# Patient Record
Sex: FEMALE | Race: WHITE | NOT HISPANIC OR LATINO
[De-identification: names, ages, dates, MRNs, and addresses within clinical notes are randomized per-mention and may not be internally consistent; named-entity substitution may affect disease eponyms.]

---

## 2011-11-03 LAB — HPV ABSTRACT: NORMAL

## 2014-05-07 LAB — HPV ABSTRACT: NORMAL

## 2016-09-02 LAB — HPV ABSTRACT: NORMAL

## 2018-10-13 ENCOUNTER — HEALTH MAINTENANCE LETTER (OUTPATIENT)
Age: 43
End: 2018-10-13

## 2018-10-18 ENCOUNTER — OFFICE VISIT (OUTPATIENT)
Dept: FAMILY MEDICINE | Facility: CLINIC | Age: 43
End: 2018-10-18

## 2018-10-18 VITALS
OXYGEN SATURATION: 99 % | TEMPERATURE: 98 F | WEIGHT: 168.8 LBS | SYSTOLIC BLOOD PRESSURE: 108 MMHG | HEART RATE: 78 BPM | HEIGHT: 62 IN | BODY MASS INDEX: 31.06 KG/M2 | RESPIRATION RATE: 16 BRPM | DIASTOLIC BLOOD PRESSURE: 82 MMHG

## 2018-10-18 DIAGNOSIS — E34.9 HORMONE IMBALANCE: ICD-10-CM

## 2018-10-18 DIAGNOSIS — K44.9 HIATAL HERNIA: ICD-10-CM

## 2018-10-18 DIAGNOSIS — R10.32 ABDOMINAL PAIN, LEFT LOWER QUADRANT: ICD-10-CM

## 2018-10-18 DIAGNOSIS — Z85.820 HISTORY OF MELANOMA EXCISION: ICD-10-CM

## 2018-10-18 DIAGNOSIS — R10.12 ABDOMINAL PAIN, LEFT UPPER QUADRANT: ICD-10-CM

## 2018-10-18 DIAGNOSIS — Z00.00 ROUTINE GENERAL MEDICAL EXAMINATION AT A HEALTH CARE FACILITY: Primary | ICD-10-CM

## 2018-10-18 DIAGNOSIS — Z13.1 SCREENING FOR DIABETES MELLITUS: ICD-10-CM

## 2018-10-18 DIAGNOSIS — N80.9 ENDOMETRIOSIS: ICD-10-CM

## 2018-10-18 DIAGNOSIS — Z23 NEED FOR PROPHYLACTIC VACCINATION AND INOCULATION AGAINST INFLUENZA: ICD-10-CM

## 2018-10-18 DIAGNOSIS — Z98.890 HISTORY OF MELANOMA EXCISION: ICD-10-CM

## 2018-10-18 DIAGNOSIS — R06.02 SHORTNESS OF BREATH: ICD-10-CM

## 2018-10-18 DIAGNOSIS — R63.5 WEIGHT GAIN: ICD-10-CM

## 2018-10-18 DIAGNOSIS — Z13.220 SCREENING CHOLESTEROL LEVEL: ICD-10-CM

## 2018-10-18 LAB
% GRANULOCYTES: 64.1 % (ref 42.2–75.2)
HCT VFR BLD AUTO: 41.1 % (ref 35–46)
HEMOGLOBIN: 13.8 G/DL (ref 11.8–15.5)
LYMPHOCYTES NFR BLD AUTO: 28 % (ref 20.5–51.1)
MCH RBC QN AUTO: 30 PG (ref 27–31)
MCHC RBC AUTO-ENTMCNC: 33.6 G/DL (ref 33–37)
MCV RBC AUTO: 89.2 FL (ref 80–100)
MONOCYTES NFR BLD AUTO: 7.9 % (ref 1.7–9.3)
PLATELET # BLD AUTO: 269 K/UL (ref 140–450)
RBC # BLD AUTO: 4.61 X10/CMM (ref 3.7–5.2)
WBC # BLD AUTO: 6.3 X10/CMM (ref 3.8–11)

## 2018-10-18 PROCEDURE — 99396 PREV VISIT EST AGE 40-64: CPT | Mod: 25 | Performed by: FAMILY MEDICINE

## 2018-10-18 PROCEDURE — 80050 GENERAL HEALTH PANEL: CPT | Mod: 90 | Performed by: FAMILY MEDICINE

## 2018-10-18 PROCEDURE — 90686 IIV4 VACC NO PRSV 0.5 ML IM: CPT | Performed by: FAMILY MEDICINE

## 2018-10-18 PROCEDURE — 82150 ASSAY OF AMYLASE: CPT | Mod: 90 | Performed by: FAMILY MEDICINE

## 2018-10-18 PROCEDURE — 90471 IMMUNIZATION ADMIN: CPT | Performed by: FAMILY MEDICINE

## 2018-10-18 PROCEDURE — 83690 ASSAY OF LIPASE: CPT | Mod: 90 | Performed by: FAMILY MEDICINE

## 2018-10-18 PROCEDURE — 99214 OFFICE O/P EST MOD 30 MIN: CPT | Mod: 25 | Performed by: FAMILY MEDICINE

## 2018-10-18 PROCEDURE — 80061 LIPID PANEL: CPT | Mod: 90 | Performed by: FAMILY MEDICINE

## 2018-10-18 PROCEDURE — 36415 COLL VENOUS BLD VENIPUNCTURE: CPT | Performed by: FAMILY MEDICINE

## 2018-10-18 NOTE — LETTER
"[facility]  [address] [place]  [address]  Phone: [phone]    October 23, 2018      Dione Villa  [address] [facility] [address]              Dear Dione,    The results from your recent visit showed Your labs all look good with the exception of your LDL (\"bad cholesterol\"). The may be genetic or due to your diet. As you mentioned during your visit, you walk occasionally for exercise, but are otherwise quite sedentary. This would be a great time to eat right-sized portions of heart healthy diet and increase your level of physical activity. We talked about how it takes 150 minutes a week just to maintain, so please look at your schedule to find ways you can increase your exercise to more than 150 minutes/week. It may also be quite helpful to increase the intensity of your activity as well.   The minimally low amylase is not a great concern - we checked it look for pancreatitis as a source of your abdominal discomfort which can happen when the lipase and amylase are high.   Everything else looks very good.         Sincerely,     Naty \"Paige\" MD Julio Cesar/Gabrielle Odonnell,CMA      Results for orders placed or performed in visit on 10/18/18   CBC with Diff/Plt ([facility])   Result Value Ref Range    WBC x10/cmm 6.3 3.8 - 11.0 x10/cmm    % Lymphocytes 28.0 20.5 - 51.1 %    % Monocytes 7.9 1.7 - 9.3 %    % Granulocytes 64.1 42.2 - 75.2 %    RBC x10/cmm 4.61 3.7 - 5.2 x10/cmm    Hemoglobin 13.8 11.8 - 15.5 g/dl    Hematocrit 41.1 35 - 46 %    MCV 89.2 80 - 100 fL    MCH 30.0 27.0 - 31.0 pg    MCHC 33.6 33.0 - 37.0 g/dL    Platelet Count 269 140 - 450 K/uL   Comp. Metabolic Panel (14) ([facility])   Result Value Ref Range    Glucose 91 65 - 99 mg/dL    Urea Nitrogen 16 6 - 24 mg/dL    Creatinine 0.88 0.57 - 1.00 mg/dL    eGFR If NonAfricn Am 80 >59 mL/min/1.73    eGFR If Africn Am 92 >59 mL/min/1.73    BUN/Creatinine Ratio 18 9 - 23    Sodium 139 134 - 144 mmol/L    Potassium 4.6 3.5 - " 5.2 mmol/L    Chloride 102 96 - 106 mmol/L    Total CO2 21 20 - 29 mmol/L    Calcium 9.4 8.7 - 10.2 mg/dL    Protein Total 7.3 6.0 - 8.5 g/dL    Albumin 4.6 3.5 - 5.5 g/dL    Globulin, Total 2.7 1.5 - 4.5 g/dL    A/G Ratio 1.7 1.2 - 2.2    Bilirubin Total 0.3 0.0 - 1.2 mg/dL    Alkaline Phosphatase 81 39 - 117 IU/L    AST 22 0 - 40 IU/L    ALT 18 0 - 32 IU/L    Narrative    Performed at:  01 - [facility] [address], [place], [place]  [id number]  : Ezekiel Cuello MD, Phone:  [phone]   [facility]id [facility]   Result Value Ref Range    Cholesterol 260 (H) 100 - 199 mg/dL    Triglycerides 100 0 - 149 mg/dL    HDL Cholesterol 63 >39 mg/dL    VLDL Cholesterol Drew 20 5 - 40 mg/dL    LDL Cholesterol Calculated 177 (H) 0 - 99 mg/dL    LDL/HDL Ratio 2.8 0.0 - 3.2 ratio    Narrative    Performed at:  01 - [facility] [address], [place], [place]  [id number]  : Ezekiel Cuello MD, Phone:  [phone]   Lipase  Serum ([facility])   Result Value Ref Range    Lipase 23 14 - 72 U/L    Narrative    Performed at:  01 - [facility] [address], [place], [place]  [id number]  : Ezekiel Cuello MD, Phone:  [phone]   Amylase  Serum ([facility])   Result Value Ref Range    Amylase 29 (L) 31 - 124 U/L    Narrative    Performed at:  01 - [facility] [address], [place], [place]  [id number]  : Ezekiel Cuello MD, Phone:  [phone]   TSH ([facility])   Result Value Ref Range    TSH 2.120 0.450 - 4.500 uIU/mL    Narrative    Performed at:  01 - [facility] [address], [place], [place]  [id number]  : Ezekiel Cuello MD, Phone:  [phone]

## 2018-10-18 NOTE — PROGRESS NOTES

## 2018-10-18 NOTE — MR AVS SNAPSHOT
After Visit Summary   10/18/2018    Dione Villa    MRN: [id number]           Patient Information     Date Of Birth          1975        Visit Information        Provider Department      10/18/2018 8:15 AM Naty Harrell MD [facility]        Today's Diagnoses     Routine general medical examination at a health care facility    -  1    Hiatal hernia        Abdominal pain, left lower quadrant        Abdominal pain, left upper quadrant        Need for prophylactic vaccination and inoculation against influenza        Screening cholesterol level        History of melanoma excision        Weight gain        Hormone imbalance        Endometriosis          Care Instructions      Preventive Health Recommendations  Female Ages 40 to 49    Yearly exam:     See your health care provider every year in order to  1. Review health changes.   2. Discuss preventive care.    3. Review your medicines if your doctor prescribed any.      Get a Pap test every three years (unless you have an abnormal result and your provider advises testing more often).      If you get Pap tests with HPV test, you only need to test every 5 years, unless you have an abnormal result. You do not need a Pap test if your uterus was removed (hysterectomy) and you have not had cancer.      You should be tested each year for STDs (sexually transmitted diseases), if you're at risk.     Ask your doctor if you should have a mammogram.      Have a colonoscopy (test for colon cancer) if someone in your family has had colon cancer or polyps before age 50.       Have a cholesterol test every 5 years.       Have a diabetes test (fasting glucose) after age 45. If you are at risk for diabetes, you should have this test every 3 years.    Shots: Get a flu shot each year. Get a tetanus shot every 10 years.     Nutrition:     Eat at least 5 servings of fruits and vegetables each day.    Eat whole-grain bread, whole-wheat pasta and  "brown rice instead of white grains and rice.    Get adequate Calcium and Vitamin D.      Lifestyle    Exercise at least 150 minutes a week (an average of 30 minutes a day, 5 days a week). This will help you control your weight and prevent disease.    Limit alcohol to one drink per day.    No smoking.     Wear sunscreen to prevent skin cancer.    See your dentist every six months for an exam and cleaning.          Follow-ups after your visit        Additional Services     Referral to [facility]       Referral to [facility]  Phone: [phone]  Fax: [phone]  Reason for referral: CT C/A/P w/ PO contrast for LUQ and LLQ abd pain, hiatal hernia, melanoma                  Who to contact     If you have questions or need follow up information about today's clinic visit or your schedule please contact [facility] directly at [phone].  Normal or non-critical lab and imaging results will be communicated to you by Canvahart, letter or phone within 4 business days after the clinic has received the results. If you do not hear from us within 7 days, please contact the clinic through Canvahart or phone. If you have a critical or abnormal lab result, we will notify you by phone as soon as possible.  Submit refill requests through S B E or call your pharmacy and they will forward the refill request to us. Please allow 3 business days for your refill to be completed.          Additional Information About Your Visit        [employer] Information     S B E lets you send messages to your doctor, view your test results, renew your prescriptions, schedule appointments and more. To sign up, go to [email or web address] . Click on \"Log in\" on the left side of the screen, which will take you to the Welcome page. Then click on \"Sign up Now\" on the right side of the page.     You will be asked to enter the access code listed below, as well as some personal information. Please follow the directions to create " "your username and password.     Your access code is: [email or web address]  Expires: 2019  9:17 AM     Your access code will  in 90 days. If you need help or a new code, please call your [place] clinic or [phone].        Care EveryWhere ID     This is your Care EveryWhere ID. This could be used by other organizations to access your [place] medical records  [id number]        Your Vitals Were     Pulse Temperature Respirations Height Pulse Oximetry BMI (Body Mass Index)    78 98  F (36.7  C) (Oral) 16 1.575 m (5' 2\") 99% 30.87 kg/m2       Blood Pressure from Last 3 Encounters:   10/18/18 108/82   16 118/86   05/29/15 110/68    Weight from Last 3 Encounters:   10/18/18 76.6 kg (168 lb 12.8 oz)   16 73.5 kg (162 lb)   05/29/15 68.9 kg (152 lb)              We Performed the Following     Amylase  Serum ([facility])     CBC with Diff/Plt ([facility])     Comp. Metabolic Panel (14) ([facility])     FLU VACCINE, SPLIT VIRUS, IM (QUADRIVALENT) [26160]- >3 YRS     Lipase  Serum ([facility])     Lipid Panel ([facility])     Referral to [facility] ([facility])     Vaccine Administration, Initial [[id number]]        Primary Care Provider Office Phone # Fax #    Naty Krys Harrell [phone] [address] [facility] [address]        Equal Access to Services     [facility]: Hadii aad ku hadasho Soomaali, waaxda luqadaha, qaybta kaalmada adenupur, janey mckeon . So [facility] [phone].    ATENCIÓN: Si habla español, tiene a byrne disposición servicios gratuitos de asistencia lingüística. Llame al [phone].    We comply with applicable federal civil rights laws and [place] laws. We do not discriminate on the basis of race, color, national origin, age, disability, sex, sexual orientation, or gender identity.            Thank you!     Thank you for choosing [facility]  for your care. Our goal is always to provide you with excellent care. Hearing " back from our patients is one way we can continue to improve our services. Please take a few minutes to complete the written survey that you may receive in the mail after your visit with us. Thank you!             Your Updated Medication List - Protect others around you: Learn how to safely use, store and throw away your medicines at www.disposemymeds.org.          This list is accurate as of 10/18/18  9:18 AM.  Always use your most recent med list.                   Brand Name Dispense Instructions for use Diagnosis    AVIANE 0.1-20 MG-MCG per tablet   Generic drug:  levonorgestrel-ethinyl estradiol      Take 1 tablet by mouth daily.        DAILY MULTI PO      Take 1 tablet by mouth daily.

## 2018-10-18 NOTE — PROGRESS NOTES
SUBJECTIVE:   CC: Dione Villa is an 43 year old woman who presents for preventive health visit.     Pt is known to [facility]; prior PCP Dr. Lea Siddiqi retired. Pt is new to me today. Last seen here 8/4/2016 for a bug bite. No prior annual physicals on file.   Sees Ob-Gyn regularly for endometriosis and her Paps. No hx of abnormal Paps.   Last mammo 11/2013 here, but has been having them regularly due to sister's hx of breast cancer. Has eight other healthy sibs. Managed by Ob-Gyn. She receives OCPs from Ob-Gyn for HRT b/c of early menopause.     Has concerns today of left upper and lower quadrant pain present today. On and off but getting worse overall.   Hx of endometriosis. Has had 6 surgeries. Still problematic for her from time to time. No fevers, chills, vomiting. Not able to find anything that relieves her pain. She had a small hiatal hernia on 2013 CT scan. She is having some intermittent dyspnea and more abd pain - wonders if her hiatal hernia is enlarging. Occas dyspnea, GERD, nausea. Pain has been more intense with fewer periods of pain free state. Stopped drinking coffee 3 yrs ago.   Has one cup of tea in the morning.   She has a hx of melanoma on R forearm that was excised. No known dale. She states she is also concerned that anytime she has pain it could be related to a possible dale.     She is concerned b/c gaining weight over last 1-2 yrs by her report. Wt is recorded as 162 lbs at her last visit here 8/4/2016. It was 152 lbs on 5/29/2015. No known thyroid dysfunction. Walks 3-4 times a week 2-3 miles each time. No cardio. No wt lifting.     Changed jobs and sit a lot. Just rec'd a standing desk at work.     CONCERNS: Digestion issues, weight gain, hiatal hernia    Healthy Habits:    Do you get at least three servings of calcium containing foods daily (dairy, green leafy vegetables, etc.)? yes    Amount of exercise or daily activities, outside of work: 4 day(s) per  week    Problems taking medications regularly No    Medication side effects: No    Have you had an eye exam in the past two years? no    Do you see a dentist twice per year? yes    Do you have sleep apnea, excessive snoring or daytime drowsiness? Daytime Drowsiness    Today's PHQ-2 Score:   PHQ-2 ( 1999 Pfizer) 10/18/2018   Q1: Little interest or pleasure in doing things 0   Q2: Feeling down, depressed or hopeless 0   PHQ-2 Score 0     Abuse: Current or Past(Physical, Sexual or Emotional) - No  Do you feel safe in your environment - Yes    Social History   Substance Use Topics     Smoking status: Former Smoker     Types: Cigarettes     Quit date: 6/16/1999     Smokeless tobacco: Never Used      Comment: was social not every day smoker     Alcohol use Yes      Comment: social maybe per month     If you drink alcohol do you typically have >3 drinks per day or >7 drinks per week? No                     Reviewed orders with patient.  Reviewed health maintenance and updated orders accordingly - Yes  BP Readings from Last 3 Encounters:   10/18/18 108/82   08/04/16 118/86   05/29/15 110/68    Wt Readings from Last 3 Encounters:   10/18/18 76.6 kg (168 lb 12.8 oz)   08/04/16 73.5 kg (162 lb)   05/29/15 68.9 kg (152 lb)          Patient Active Problem List   Diagnosis     Dyspnea     Health Care Home     History of melanoma excision     Hormone imbalance     Endometriosis     Past Surgical History:   Procedure Laterality Date     ABDOMEN SURGERY      6 laproscopic for endometriosis     CHOLECYSTECTOMY  2002       Social History   Substance Use Topics     Smoking status: Former Smoker     Types: Cigarettes     Quit date: 6/16/1999     Smokeless tobacco: Never Used      Comment: was social not every day smoker     Alcohol use Yes      Comment: social maybe per month     Family History   Problem Relation Age of Onset     Hypertension Mother      Cancer Father      Multiple myeloma     HEART DISEASE Father      Breast Cancer  "Sister          Current Outpatient Prescriptions   Medication Sig Dispense Refill     levonorgestrel-ethinyl estradiol (AVIANE) 0.1-20 MG-MCG per tablet Take 1 tablet by mouth daily.       Multiple Vitamins-Minerals (DAILY MULTI PO) Take 1 tablet by mouth daily.       Allergies   Allergen Reactions     Sulfa Drugs Rash     Recent Labs   Lab Test  11/22/13   1249   ALT  45*   CR  0.80   POTASSIUM  4.3      Reviewed and updated as needed this visit by clinical staff  Tobacco  Allergies  Meds  Problems  Surg Hx  Fam Hx         Reviewed and updated as needed this visit by Provider  Problems  Surg Hx  Fam Hx        Past Medical History:   Diagnosis Date     Endometriosis      Malignant melanoma nos 7-2008    right arm      Past Surgical History:   Procedure Laterality Date     ABDOMEN SURGERY      6 laproscopic for endometriosis     CHOLECYSTECTOMY  2002     ROS:  CONSTITUTIONAL: NEGATIVE for fever, chills, change in weight  INTEGUMENTARY/SKIN: NEGATIVE for worrisome rashes, moles or lesions  EYES: NEGATIVE for vision changes or irritation  ENT: NEGATIVE for ear, mouth and throat problems  RESP: POSITIVE for dyspnea at rest - intermittent. No cough. No wheezing.   BREAST: NEGATIVE for masses, tenderness or discharge  CV: NEGATIVE for chest pain, palpitations or peripheral edema  GI: POSITIVE for ongoing abd pain.   : NEGATIVE for unusual urinary or vaginal symptoms. No vaginal bleeding.  MUSCULOSKELETAL: NEGATIVE for significant arthralgias or myalgia  NEURO: NEGATIVE for weakness, dizziness or paresthesias  PSYCHIATRIC: NEGATIVE for changes in mood or affect     OBJECTIVE:   /82  Pulse 78  Temp 98  F (36.7  C) (Oral)  Resp 16  Ht 1.575 m (5' 2\")  Wt 76.6 kg (168 lb 12.8 oz)  SpO2 99%  BMI 30.87 kg/m2  EXAM:  GENERAL APPEARANCE: healthy, alert and no distress  EYES: Eyes grossly normal to inspection, PERRL and conjunctivae and sclerae normal  HENT: ear canals and TM's normal, nose and mouth " without ulcers or lesions, oropharynx clear and oral mucous membranes moist  NECK: no adenopathy, no asymmetry, masses, or scars and thyroid normal to palpation  RESP: lungs clear to auscultation - no rales, rhonchi or wheezes  BREAST: Deferred - done at her Ob-Gyn's office.   CV: regular rate and rhythm, normal S1 S2, no S3 or S4, no murmur, click or rub, no peripheral edema and peripheral pulses strong  ABDOMEN: soft, tender along rectus muscles B to umbilicus. Notes pain in LUQ and LLQ w/ palpation. No rebound or guarding. No hepatosplenomegaly, no masses and bowel sounds are normal  MS: no musculoskeletal defects are noted and gait is age appropriate without ataxia  SKIN: no suspicious lesions or rashes. Well healed melanoma excision site on R forearm.   NEURO: Normal strength and tone, sensory exam grossly normal, mentation intact and speech normal  PSYCH: mentation appears normal and affect normal/bright    ASSESSMENT/PLAN:   Dione was seen today for physical and flu shot.    Diagnoses and all orders for this visit:    Routine general medical examination at a health care facility    Hiatal hernia  -     Referral to [facility] - CT C/A/P - including chest due to dyspnea.    OK to try PPI daily as a trial until results available.    Abdominal pain, left lower quadrant  -     Cancel: Referral to [facility]  -     CBC with Diff/Plt ([facility])  -     Comp. Metabolic Panel (14) ([facility])  -     Lipid Panel ([facility])  -     Lipase  Serum ([facility])  -     Amylase  Serum ([facility])  -     Referral to [facility]    Abdominal pain, left upper quadrant  -     Cancel: Referral to [facility]  -     CBC with Diff/Plt ([facility])  -     Comp. Metabolic Panel (14) ([facility])  -     Lipase  Serum ([facility])  -     Amylase  Serum ([facility])  -     Referral to [facility]    Need for prophylactic vaccination and inoculation against influenza  -     FLU VACCINE, SPLIT VIRUS, IM (QUADRIVALENT) [89851]- >3 YRS  -      "Vaccine Administration, Initial [71333]   Counseled pt on vaccination administered today - no known complications.     Screening cholesterol level  -     Lipid Panel ([facility])    History of melanoma excision  -     Referral to [facility]    Weight gain  -     TSH ([facility])    Hormone imbalance   Managed by her OB-Gyn.     Endometriosis   Unclear if contributing to her diffuse abd pain.     Dyspnea   CT C/A/P    Screening for diabetes  Included in CMP    We did discuss visit today will be split billed due to the additional time, imaging and labs ordered to assess her abd pain. She is aware.     COUNSELING:   Reviewed preventive health counseling, as reflected in patient instructions       Regular exercise       Healthy diet/nutrition       Vision screening       Hearing screening       Immunizations    Vaccinated for: Influenza         Contraception - managed by Ob-Gyn. She does not smoke and no migraines w/ auras.        Colon cancer screening    BP Readings from Last 1 Encounters:   10/18/18 108/82     Estimated body mass index is 30.87 kg/(m^2) as calculated from the following:    Height as of this encounter: 1.575 m (5' 2\").    Weight as of this encounter: 76.6 kg (168 lb 12.8 oz).      Weight management plan: Discussed healthy diet and exercise guidelines and patient will follow up in 12 months in clinic to re-evaluate. Recommended increasing her level of physical activity - 150 minutes just to maintain, will need to increase duration and intensity to lose weight.      reports that she quit smoking about 19 years ago. Her smoking use included Cigarettes. She has never used smokeless tobacco.      Counseling Resources:  ATP IV Guidelines  Pooled Cohorts Equation Calculator  Breast Cancer Risk Calculator  FRAX Risk Assessment  ICSI Preventive Guidelines  Dietary Guidelines for Americans, 2010  USDA's MyPlate  ASA Prophylaxis  Lung CA Screening    Naty Harrell MD  [facility] " Maintenance   Topic Date Due     PHQ-2 Q1 YR  01/04/1985     HIV SCREEN (SYSTEM ASSIGNED)  01/04/1991     PAP SCREENING Q3 YR (SYSTEM ASSIGNED)  11/02/2014     LIPID SCREEN Q5 YR FEMALE (SYSTEM ASSIGNED)  01/04/2018     INFLUENZA VACCINE (1) 09/01/2018     TETANUS IMMUNIZATION (SYSTEM ASSIGNED)  08/04/2026

## 2018-10-19 LAB
ALBUMIN SERPL-MCNC: 4.6 G/DL (ref 3.5–5.5)
ALBUMIN/GLOB SERPL: 1.7 {RATIO} (ref 1.2–2.2)
ALP SERPL-CCNC: 81 IU/L (ref 39–117)
ALT SERPL-CCNC: 18 IU/L (ref 0–32)
AMYLASE SERPL-CCNC: 29 U/L (ref 31–124)
AST SERPL-CCNC: 22 IU/L (ref 0–40)
BILIRUB SERPL-MCNC: 0.3 MG/DL (ref 0–1.2)
BUN SERPL-MCNC: 16 MG/DL (ref 6–24)
BUN/CREATININE RATIO: 18 (ref 9–23)
CALCIUM SERPL-MCNC: 9.4 MG/DL (ref 8.7–10.2)
CHLORIDE SERPLBLD-SCNC: 102 MMOL/L (ref 96–106)
CHOLEST SERPL-MCNC: 260 MG/DL (ref 100–199)
CREAT SERPL-MCNC: 0.88 MG/DL (ref 0.57–1)
EGFR IF AFRICN AM: 92 ML/MIN/1.73
EGFR IF NONAFRICN AM: 80 ML/MIN/1.73
GLOBULIN, TOTAL: 2.7 G/DL (ref 1.5–4.5)
GLUCOSE SERPL-MCNC: 91 MG/DL (ref 65–99)
HDLC SERPL-MCNC: 63 MG/DL
LDL/HDL RATIO: 2.8 RATIO (ref 0–3.2)
LDLC SERPL CALC-MCNC: 177 MG/DL (ref 0–99)
LIPASE SERPL-CCNC: 23 U/L (ref 14–72)
POTASSIUM SERPL-SCNC: 4.6 MMOL/L (ref 3.5–5.2)
PROT SERPL-MCNC: 7.3 G/DL (ref 6–8.5)
SODIUM SERPL-SCNC: 139 MMOL/L (ref 134–144)
TOTAL CO2: 21 MMOL/L (ref 20–29)
TRIGL SERPL-MCNC: 100 MG/DL (ref 0–149)
TSH BLD-ACNC: 2.12 UIU/ML (ref 0.45–4.5)
VLDLC SERPL CALC-MCNC: 20 MG/DL (ref 5–40)

## 2018-10-24 ENCOUNTER — TRANSFERRED RECORDS (OUTPATIENT)
Dept: FAMILY MEDICINE | Facility: CLINIC | Age: 43
End: 2018-10-24

## 2018-10-30 ENCOUNTER — TELEPHONE (OUTPATIENT)
Dept: FAMILY MEDICINE | Facility: CLINIC | Age: 43
End: 2018-10-30

## 2018-10-30 NOTE — TELEPHONE ENCOUNTER
Called patient with result of CT of chest/abdomen.  Informed her of normal CT and no reason found for her abdominal pain.  Small uterine fibroid seen.  Patient says her abdominal pain is much better. She thinks is digestive problem and is working on her diet to see what affects her and what she should not be eating.

## 2019-10-01 ENCOUNTER — HEALTH MAINTENANCE LETTER (OUTPATIENT)
Age: 44
End: 2019-10-01

## 2019-12-15 ENCOUNTER — HEALTH MAINTENANCE LETTER (OUTPATIENT)
Age: 44
End: 2019-12-15

## 2021-01-15 ENCOUNTER — HEALTH MAINTENANCE LETTER (OUTPATIENT)
Age: 46
End: 2021-01-15

## 2021-01-23 ENCOUNTER — HEALTH MAINTENANCE LETTER (OUTPATIENT)
Age: 46
End: 2021-01-23

## 2021-07-21 ENCOUNTER — TRANSFERRED RECORDS (OUTPATIENT)
Dept: FAMILY MEDICINE | Facility: CLINIC | Age: 46
End: 2021-07-21

## 2021-07-28 ENCOUNTER — TRANSFERRED RECORDS (OUTPATIENT)
Dept: FAMILY MEDICINE | Facility: CLINIC | Age: 46
End: 2021-07-28

## 2021-08-10 ENCOUNTER — OFFICE VISIT (OUTPATIENT)
Dept: FAMILY MEDICINE | Facility: CLINIC | Age: 46
End: 2021-08-10

## 2021-08-10 VITALS
SYSTOLIC BLOOD PRESSURE: 126 MMHG | RESPIRATION RATE: 18 BRPM | OXYGEN SATURATION: 98 % | HEART RATE: 70 BPM | WEIGHT: 170 LBS | HEIGHT: 62 IN | BODY MASS INDEX: 31.28 KG/M2 | DIASTOLIC BLOOD PRESSURE: 98 MMHG | TEMPERATURE: 97.9 F

## 2021-08-10 DIAGNOSIS — E78.5 DYSLIPIDEMIA: ICD-10-CM

## 2021-08-10 DIAGNOSIS — E66.09 CLASS 1 OBESITY DUE TO EXCESS CALORIES WITHOUT SERIOUS COMORBIDITY WITH BODY MASS INDEX (BMI) OF 31.0 TO 31.9 IN ADULT: Primary | ICD-10-CM

## 2021-08-10 DIAGNOSIS — E66.811 CLASS 1 OBESITY DUE TO EXCESS CALORIES WITHOUT SERIOUS COMORBIDITY WITH BODY MASS INDEX (BMI) OF 31.0 TO 31.9 IN ADULT: Primary | ICD-10-CM

## 2021-08-10 DIAGNOSIS — B00.1 COLD SORE: ICD-10-CM

## 2021-08-10 DIAGNOSIS — Z12.11 COLON CANCER SCREENING: ICD-10-CM

## 2021-08-10 DIAGNOSIS — R10.2 PELVIC PAIN IN FEMALE: ICD-10-CM

## 2021-08-10 PROCEDURE — 99214 OFFICE O/P EST MOD 30 MIN: CPT | Performed by: NURSE PRACTITIONER

## 2021-08-10 PROCEDURE — 36415 COLL VENOUS BLD VENIPUNCTURE: CPT | Performed by: NURSE PRACTITIONER

## 2021-08-10 ASSESSMENT — ANXIETY QUESTIONNAIRES
2. NOT BEING ABLE TO STOP OR CONTROL WORRYING: NOT AT ALL
1. FEELING NERVOUS, ANXIOUS, OR ON EDGE: NOT AT ALL
7. FEELING AFRAID AS IF SOMETHING AWFUL MIGHT HAPPEN: NOT AT ALL
IF YOU CHECKED OFF ANY PROBLEMS ON THIS QUESTIONNAIRE, HOW DIFFICULT HAVE THESE PROBLEMS MADE IT FOR YOU TO DO YOUR WORK, TAKE CARE OF THINGS AT HOME, OR GET ALONG WITH OTHER PEOPLE: NOT DIFFICULT AT ALL
3. WORRYING TOO MUCH ABOUT DIFFERENT THINGS: NOT AT ALL
GAD7 TOTAL SCORE: 2
6. BECOMING EASILY ANNOYED OR IRRITABLE: SEVERAL DAYS
5. BEING SO RESTLESS THAT IT IS HARD TO SIT STILL: NOT AT ALL

## 2021-08-10 ASSESSMENT — MIFFLIN-ST. JEOR: SCORE: 1354.36

## 2021-08-10 ASSESSMENT — PATIENT HEALTH QUESTIONNAIRE - PHQ9
5. POOR APPETITE OR OVEREATING: SEVERAL DAYS
SUM OF ALL RESPONSES TO PHQ QUESTIONS 1-9: 4

## 2021-08-10 NOTE — PROGRESS NOTES
Problem(s) Oriented visit        SUBJECTIVE:                                                    Dione Villa is a 46 year old female who presents to clinic today for the following health issues :    Weight gain: Dione reports weight loss and weight gain over the years- gained weight up to 168lb in 2018, lost weight down to 150lb, and now in the past year during the pandemic has gained 20lb and today weighs 170lb. She notes that there have been lifestyle changes that could be attributing to the weight gain, but also notes that there are familial problems with thyroid function and she wants to make sure there is not an underlying pathology causing the increased weight gain. She is trying to exercise more again and has a pretty good regimen (see below), generally eats a healthy diet, but admits that she has been having more sugar cravings that she never used to have. She has done Weight Watchers in the past, tried Noom but this wasn't for her, has done a lot of reading and self education on weight management, and tracks calories through [employer].    Lifestyle:   to her  who has been working at homeless shelter and out in the field during pandemic  Caring for her two children   for graduate school- lost her two assistants and has been doing their work on top of hers during the pandemic, extremely stressful  Working from home, no longer has sit stand desk         Takes vitamin D, multivitamin, calcium    Walks two to 2.5miles every day, rides 8-10 mile bike loop at least twice per week. Occasionally lifts weight but doesn't have a specific routine. Started getting back into walk/running.  24h diet:  Woke up at 0430, had 2 cups of coffee with cream (1tbsp)   Meditation and prayer  Not very hungry in the morning- yesterday had a plum and a kiwi, sometimes will have an egg with fruit  10AM: handful of pistachios  Tuna with egg/hood on a lettuce leaf with some gluten free seed  "crackers  Two pieces saltwater taffy at 2  Piece of mozzarella cheese, little baby peppers  Dinner was chicken breast with roasted veg (brussel sprouts, sweet potatoes, purple cauliflower)  Bowl of skinny pop around 9:30  During the day drinks Hint water and regular water, occasionally a Denice cherry juice with B12 in it    Rare alcohol, quit smoking, no illicit drugs    Has had eight surgeries for endometriosis over the years, sees OBGYN. Stopped menstruating at 26 and started again at 36, then stopped a few years ago. Low libido, possibly impacted by weight gain and stress. Notes longstanding history of painful intercourse as well.    BP slightly elevated today- no chest pain or pressure, SOB/REBOLLEDO, peripheral edema, or neurologic changes    ADDENDUM: Reported per MyChart that she has occasional outbreaks of cold sores on her lips, typically uses an antiviral cream    PHQ 8/10/2021   PHQ-9 Total Score 4   Q9: Thoughts of better off dead/self-harm past 2 weeks Not at all     SEBAS-7 SCORE 8/10/2021   Total Score 2         Problem list, Medication list, Allergies, and Medical/Social/Surgical histories reviewed in [facility] and updated as appropriate.   Additional history: as documented    ROS:  Gen, CV, resp, GI, , neuro, endo, psych negative except as listed per HPI      OBJECTIVE:                                                    Physical Exam:    BP (!) 126/98   Pulse 70   Temp 97.9  F (36.6  C) (Temporal)   Resp 18   Ht 1.575 m (5' 2\")   Wt 77.1 kg (170 lb)   SpO2 98%   BMI 31.09 kg/m      CONSTITUTIONAL: Alert non-toxic appearing female in no acute distress  RESPIRATORY: Lungs clear to auscultation, respirations unlabored  CV: Regular rate and rhythm, S1S2, no clicks, murmurs, rubs, or gallops; BLE without edema  GASTROINTESTINAL: Abdomen soft, non-distended, and non-tender to palpation  NEUROLOGIC: No gross deficits  PSYCHIATRIC: Pleasant and interactive, affect euthymic, makes appropriate eye contact, thought " process logical       ASSESSMENT/PLAN:                                                          Dione was seen today for consult.    Diagnoses and all orders for this visit:    Class 1 obesity due to excess calories without serious comorbidity with body mass index (BMI) of 31.0 to 31.9 in adult  -     TSH ([facility])  -     Lipid Panel ([facility])  -     Hemoglobin A1C ([facility])  -     Comp. Metabolic Panel (14) ([facility])  -     VENOUS COLLECTION  -     Semaglutide-Weight Management 0.25 MG/0.5ML SOAJ; Inject 0.25 mg Subcutaneous every 7 days for 4 doses    Struggling with weight gain and loss the past several years. Commended on her many excellent health habits and the things she is doing well right now. Discussed healthy diet and exercise as well as pharmacotherapy as adjunctive agent- after discussion of different types of weight loss medications, risks/benefits/indications, she elects to start treatment with semaglutide. Will start 0.25mg weekly x4 weeks and recheck in clinic at that time. If tolerating well, will increase to 0.5mg x4 weeks, then 1mg x4 weeks up to max dose of 2.4mg/week. Labs obtained.    Pelvic pain in female  -     DEEP PT and Hand Referral; Future    Longstanding history of painful intercourse, suspect this could be due to hx of endometriosis and several pelvic surgeries. Up to date on pelvic exam/cervical cancer screening through her OB. Referral to pelvic PT.    Colon cancer screening  -     Adult Gastro Ref - Procedure Only; Future    ADDENDUM:    Cold sore  -     valACYclovir (VALTREX) 1000 mg tablet; Take 2 tablets (2,000 mg) by mouth 2 times daily for 1 day    For outbreaks    Dyslipidemia  -     rosuvastatin (CRESTOR) 20 MG tablet; Take 1 tablet (20 mg) by mouth daily    LDL significantly elevated at 197- to start rosuvastatin 20mg daily, recheck lipid panel in 4 weeks. Reviewed side effects of medications, alarm signs and symptoms, and when to seek further care.                The  following health maintenance items are reviewed in Epic and correct as of today:  Health Maintenance   Topic Date Due     ADVANCE CARE PLANNING  Never done     HIV SCREENING  Never done     HEPATITIS C SCREENING  Never done     PAP  2014     PREVENTIVE CARE VISIT  10/18/2019     PHQ-2  2021     INFLUENZA VACCINE (1) 2021     MAMMO SCREENING  2022     LIPID  10/18/2023     DTAP/TDAP/TD IMMUNIZATION (4 - Td or Tdap) 2026     COVID-19 Vaccine  Completed     Pneumococcal Vaccine: Pediatrics (0 to 5 Years) and At-Risk Patients (6 to 64 Years)  Aged Out     IPV IMMUNIZATION  Aged Out     MENINGITIS IMMUNIZATION  Aged Out     HEPATITIS B IMMUNIZATION  Aged Out       Patient Instructions   Sweat carlos  Wegovy vs Saxenda  Patient Education     Semaglutide Pen Injector 2 mg/1.5 mL  Uses  For diabetes.  Instructions  This medicine will be given to you at home.  This medicine is used by injecting it into the skin. Please ask your doctor, nurse or pharmacist for the correct places on your body where this medicine can be injected.  Read and make sure you understand the instructions for measuring your dose and using the syringe before using this medicine.  Always inspect the medicine before using.  The liquid should be clear and colorless.  Do not use the medicine if it contains any particles or if it has changed color.  Store new medicine in the refrigerator until you are ready to use it. Do not allow them to freeze.  If the medicine becomes frozen, you will need to throw it away.  Protect medicine from light.  Once opened, the medicine may be kept in the refrigerator or at room temperature.  Do not store the medicine pen with the needle attached. Always remove the needle after each use.  Discard the injectable pen 56 days after first use, even if there is medicine left in the pen.  Never use any medicine that has .  Please ask your doctor, nurse, or pharmacist how to discard unused medicines  safely.  Ask your doctor, nurse or pharmacist to show you how to use this medicine correctly.  You or a family member can be trained to give this medicine at home.  Change the location of the injection each time. Choose a location at least 1 inch from the last injection.  It may take several weeks for this medicine to fully work.  It is important that you keep taking each dose of this medicine on time even if you are feeling well.  If you forget to use a dose on time, use it as soon as you remember. If it has been more than 5 days, skip the missed dose and use your next dose as scheduled. Do not use 2 doses of this medicine at one time.  Please tell your doctor and pharmacist about all the medicines you take. Include both prescription and over-the-counter medicines. Also tell them about any vitamins, herbal medicines, or anything else you take for your health.  Be sure to follow your regular meal plan and exercise as discussed with your doctor.  If your symptoms do not improve or they worsen while on this medicine, contact your doctor.  This medicine may cause low blood sugar. It is very important to have regular meals and exercise regularly as instructed by your doctor. Notify your doctor if you experience symptoms of low blood sugar.  Symptoms of low blood sugar may include nausea, shaking, sweating, cold skin, fast heartbeat, hunger, and irritability.  Do not suddenly stop taking this medicine. Check with your doctor before stopping.  It is very important that you keep all appointments for medical exams and tests while on this medicine.  Cautions  Tell your doctor and pharmacist if you ever had an allergic reaction to a medicine. Symptoms of an allergic reaction can include trouble breathing, skin rash, itching, swelling, or severe dizziness.  This medicine is associated with a rare but very serious medical condition. Please speak with your doctor about symptoms you should look out for while on this medicine.  Notify your doctor immediately if you develop those symptoms.  Some patients taking this medicine have experienced serious side effects. Please speak with your doctor to understand the risks and benefits associated with this medicine.  Patients with very low blood sugar may become very confused, lose consciousness, or have seizures.  Monitor your blood sugar as instructed by your doctor.  Do not use the medication any more than instructed.  Please check with your doctor before drinking alcohol while on this medicine.  Contact your doctor if you notice a change in the amount or darkening of your urine.  Tell the doctor or pharmacist if you are pregnant, planning to be pregnant, or breastfeeding.  Ask your pharmacist if this medicine can interact with any of your other medicines. Be sure to tell them about all the medicines you take.  Always carry an ID card or wear a medical alert bracelet showing that you are diabetic.  Carry glucose tablets or hard candy with you in case you experience low blood sugar from this medicine.  Please tell all your doctors and dentists that you are on this medicine before they provide care.  Do not start or stop any other medicines without first speaking to your doctor or pharmacist.  Used needles and syringes should be thrown away properly in a medical waste container. Ask your doctor or pharmacist if you need help.  Do not share this medicine with anyone who has not been prescribed this medicine.  This medicine can cause serious side effects in some patients. Important information from the [place] Food and Drug Administration (FDA) is available from your pharmacist. Please review it carefully with your pharmacist to understand the risks associated with this medicine.  Side Effects  The following is a list of some common side effects from this medicine. Please speak with your doctor about what you should do if you experience these or other side  effects.    constipation    diarrhea    reaction at the area of the injection (pain, redness, swelling)    nausea    vomiting  Call your doctor or get medical help right away if you notice any of these more serious side effects:    dizziness    swelling in the neck or throat    fast or irregular heart beats    unusual or long-lasting hoarseness    signs of kidney damage (such as bloody or bubbly urine, or changes in urine color or amount)    unusual growth or lump on the neck    feeling of numbness or tingling in your hands and feet    inflammation of the pancreas    shakiness    shortness of breath    severe stomach pain that spreads to the back    difficulty swallowing    sweating    blurring or changes of vision    severe or persistent vomiting  A few people may have an allergic reactions to this medicine. Symptoms can include difficulty breathing, skin rash, itching, swelling, or severe dizziness. If you notice any of these symptoms, seek medical help quickly.  Extra  Please speak with your doctor, nurse, or pharmacist if you have any questions about this medicine.  [email or web address]  IMPORTANT NOTE: This document tells you briefly how to take your medicine, but it does not tell you all there is to know about it.Your doctor or pharmacist may give you other documents about your medicine. Please talk to them if you have any questions.Always follow their advice. There is a more complete description of this medicine available in English.Scan this code on your smartphone or tablet or use the web address below. You can also ask your pharmacist for a printout. If you have any questions, please ask your pharmacist.     2021 [employer].         Patient Education     Medical Nutrition Therapy for Weight Loss  What is medical nutrition therapy for weight loss?  Medical nutrition therapy is a type of treatment for people who are overweight. It s also for people with certain health  conditions. During treatment, you will work with a registered dietitian to make a nutrition plan just for you.   A registered dietitian is a type of healthcare provider with special training in nutrition. This training qualifies them to give counseling on nutrition.   During medical nutrition therapy, your dietitian will look closely at your eating habits. They will help you set new nutrition goals. You will meet with your dietitian several times. They will track your progress at each visit. Your dietitian can help you set realistic weight-loss goals. Most people should aim to lose about 1 to 1.5 pounds per week.   Many people find medical nutrition therapy helpful for weight loss. Your dietitian will tell you how many calories to eat per day to lose weight steadily and safely. They can help you plan a healthy, nutritious diet. This can help you make positive lifestyle changes that last.   Why might I need medical nutrition therapy for weight loss?   Many people know that they need to lose weight, but they are unsure how to do so. A dietitian can work with you to address your issues. Many people find this is helpful.   You may not realize that you need to avoid certain foods and eat more of others. Or maybe you are already eating the right foods, but your portion sizes are too big. Medical nutrition therapy can help you make lasting changes.   If you weigh too much, it s important that you lose weight. This is even more important if you are obese. Weighing too much increases your chance of many health problems. These include:     Diabetes    Arthritis    High blood pressure    Heart disease    Stroke    Sleep apnea    Liver disease    Infertility    Lung diseases    Certain cancers    Mental health problems  Medical nutrition therapy can also help people with other health issues. This includes people with eating disorders, people who have had bariatric (weight loss) surgery, and those with cancer or diabetes.   What  are the risks of medical nutrition therapy for weight loss?   If you work closely with your dietitian and follow their advice, this type of therapy doesn t come with any risks. Your dietitian will work with you to make sure you don t lose too much weight too quickly (which might carry risks).   If you have any concerns, talk with your healthcare provider. Ask if medical nutritional therapy is right for you. If you are pregnant or have serious health issues, it may not be safe for you to lose weight.   How do I get ready for medical nutrition therapy for weight loss?   Ask your dietitian how to prepare for your first visit. Some dietitians may ask you to keep a food journal for a few days before you first meet. You should record all of the food you eat during that time. If your dietitian asks you to do this, be honest. This will help your dietitian set realistic goals for you.   What happens during medical nutrition therapy for weight loss?   Your first appointment will likely last about an hour. You may need several follow-up visits to keep track of your progress. Follow-up visits may be shorter.   First, your dietitian will look at your current diet. This normally involves keeping a food diary for several days. Your dietitian will use this information to study your diet and determine where you need to make changes. Your dietitian s suggestions will follow the latest science.   Your dietitian will teach you how to make better food choices and work with you to achieve realistic weight-loss goals. You may get handouts or other educational materials. For many people, this is about 1 to 1.5 pounds of weight loss per week. You may also learn how to:     Read food labels    Understand how much of each nutrient, such as calcium and sodium, you need in your diet    Eat the right number of calories for you    Eat enough protein and fiber. This can make you feel gonzalez.    Eat the right variety of foods    Eat enough fruits,  vegetables, and whole grains    Eat leaner cuts of meat and lower fat dairy products    Limit your intake of fried foods and other foods high in unhealthy fats    Watch your portion sizes    Drink water instead of other beverages that are high in calories (like non-diet sodas and most juices)    Increase your activity level, such as walking daily  Your dietitian may suggest using pre-prepared products, such as frozen meals. These can be a good tool to help you learn to manage portion sizes. You may talk about other topics as well, such as spotting your triggers for overeating, making strategies for coping with stress, and forming more positive thoughts about food.   Your dietitian will tailor these suggestions to your preferences and health needs. You will need to keep portion sizes in check and reduce how often you eat certain foods. Be upfront with your dietitian about the changes you re ready to make. You should also be honest about which changes are difficult for you.   Your dietitian will carefully watch and guide you over a series of visits. They may also ask to meet with your family. This way, they can support you through the process.   What happens after medical nutrition therapy for weight loss?   How well medical nutrition therapy works depends on how much effort you put into it. Your dietitian may support you along the way and give you information and suggestions, but you must commit to making the healthy changes.   After your first series of visits with your dietitian, you can always come back for follow-up checkups. You might want to do this if your weight loss stalls or if you have trouble maintaining your weight loss.   You may also want to ask your healthcare provider for a referral to an exercise or mental health specialist. You can also ask about weight-loss support groups. Working with others can help you reach your weight-loss goals.   Weight loss works best when healthy eating habits are paired  with other changes, such as getting more exercise. Talk with your healthcare provider before starting a new exercise program.   Know that losing weight takes hard work and time. Making small but significant lifestyle changes can have positive long-term effects. It can be challenging to make these changes, but the benefits are worth it.   Next steps  Before you agree to the treatment make sure you know:     The name of the test or procedure    The reason you are having the test or procedure    What results to expect and what they mean    The risks and benefits of the test or procedure    What the possible side effects or complications are    When and where you are to have the test or procedure    Who will do the test or procedure and what that person s qualifications are    What would happen if you did not have the test or procedure    Any alternative tests or procedures to think about    When and how will you get the results Who to call after the test or procedure if you have questions or problems    How much will you have to pay for the test or procedure    9762-2355 [employer] All rights reserved. This information is not intended as a substitute for professional medical care. Always follow your healthcare professional's instructions.           Pelvic PT will call you to schedule  Colonoscopy!      SHAINA Arroyo CNP  [facility]  Family Practice  [facility]  [phone]    For any issues my office # is [phone]

## 2021-08-10 NOTE — PATIENT INSTRUCTIONS
Sweat carlos  Wegovy vs Saxenda  Patient Education     Semaglutide Pen Injector 2 mg/1.5 mL  Uses  For diabetes.  Instructions  This medicine will be given to you at home.  This medicine is used by injecting it into the skin. Please ask your doctor, nurse or pharmacist for the correct places on your body where this medicine can be injected.  Read and make sure you understand the instructions for measuring your dose and using the syringe before using this medicine.  Always inspect the medicine before using.  The liquid should be clear and colorless.  Do not use the medicine if it contains any particles or if it has changed color.  Store new medicine in the refrigerator until you are ready to use it. Do not allow them to freeze.  If the medicine becomes frozen, you will need to throw it away.  Protect medicine from light.  Once opened, the medicine may be kept in the refrigerator or at room temperature.  Do not store the medicine pen with the needle attached. Always remove the needle after each use.  Discard the injectable pen 56 days after first use, even if there is medicine left in the pen.  Never use any medicine that has .  Please ask your doctor, nurse, or pharmacist how to discard unused medicines safely.  Ask your doctor, nurse or pharmacist to show you how to use this medicine correctly.  You or a family member can be trained to give this medicine at home.  Change the location of the injection each time. Choose a location at least 1 inch from the last injection.  It may take several weeks for this medicine to fully work.  It is important that you keep taking each dose of this medicine on time even if you are feeling well.  If you forget to use a dose on time, use it as soon as you remember. If it has been more than 5 days, skip the missed dose and use your next dose as scheduled. Do not use 2 doses of this medicine at one time.  Please tell your doctor and pharmacist about all the medicines you take.  Include both prescription and over-the-counter medicines. Also tell them about any vitamins, herbal medicines, or anything else you take for your health.  Be sure to follow your regular meal plan and exercise as discussed with your doctor.  If your symptoms do not improve or they worsen while on this medicine, contact your doctor.  This medicine may cause low blood sugar. It is very important to have regular meals and exercise regularly as instructed by your doctor. Notify your doctor if you experience symptoms of low blood sugar.  Symptoms of low blood sugar may include nausea, shaking, sweating, cold skin, fast heartbeat, hunger, and irritability.  Do not suddenly stop taking this medicine. Check with your doctor before stopping.  It is very important that you keep all appointments for medical exams and tests while on this medicine.  Cautions  Tell your doctor and pharmacist if you ever had an allergic reaction to a medicine. Symptoms of an allergic reaction can include trouble breathing, skin rash, itching, swelling, or severe dizziness.  This medicine is associated with a rare but very serious medical condition. Please speak with your doctor about symptoms you should look out for while on this medicine. Notify your doctor immediately if you develop those symptoms.  Some patients taking this medicine have experienced serious side effects. Please speak with your doctor to understand the risks and benefits associated with this medicine.  Patients with very low blood sugar may become very confused, lose consciousness, or have seizures.  Monitor your blood sugar as instructed by your doctor.  Do not use the medication any more than instructed.  Please check with your doctor before drinking alcohol while on this medicine.  Contact your doctor if you notice a change in the amount or darkening of your urine.  Tell the doctor or pharmacist if you are pregnant, planning to be pregnant, or breastfeeding.  Ask your  pharmacist if this medicine can interact with any of your other medicines. Be sure to tell them about all the medicines you take.  Always carry an ID card or wear a medical alert bracelet showing that you are diabetic.  Carry glucose tablets or hard candy with you in case you experience low blood sugar from this medicine.  Please tell all your doctors and dentists that you are on this medicine before they provide care.  Do not start or stop any other medicines without first speaking to your doctor or pharmacist.  Used needles and syringes should be thrown away properly in a medical waste container. Ask your doctor or pharmacist if you need help.  Do not share this medicine with anyone who has not been prescribed this medicine.  This medicine can cause serious side effects in some patients. Important information from the [place] Food and Drug Administration (FDA) is available from your pharmacist. Please review it carefully with your pharmacist to understand the risks associated with this medicine.  Side Effects  The following is a list of some common side effects from this medicine. Please speak with your doctor about what you should do if you experience these or other side effects.    constipation    diarrhea    reaction at the area of the injection (pain, redness, swelling)    nausea    vomiting  Call your doctor or get medical help right away if you notice any of these more serious side effects:    dizziness    swelling in the neck or throat    fast or irregular heart beats    unusual or long-lasting hoarseness    signs of kidney damage (such as bloody or bubbly urine, or changes in urine color or amount)    unusual growth or lump on the neck    feeling of numbness or tingling in your hands and feet    inflammation of the pancreas    shakiness    shortness of breath    severe stomach pain that spreads to the back    difficulty swallowing    sweating    blurring or changes of vision    severe or persistent  vomiting  A few people may have an allergic reactions to this medicine. Symptoms can include difficulty breathing, skin rash, itching, swelling, or severe dizziness. If you notice any of these symptoms, seek medical help quickly.  Extra  Please speak with your doctor, nurse, or pharmacist if you have any questions about this medicine.  [email or web address]  IMPORTANT NOTE: This document tells you briefly how to take your medicine, but it does not tell you all there is to know about it.Your doctor or pharmacist may give you other documents about your medicine. Please talk to them if you have any questions.Always follow their advice. There is a more complete description of this medicine available in English.Scan this code on your smartphone or tablet or use the web address below. You can also ask your pharmacist for a printout. If you have any questions, please ask your pharmacist.     2021 [employer].         Patient Education     Medical Nutrition Therapy for Weight Loss  What is medical nutrition therapy for weight loss?  Medical nutrition therapy is a type of treatment for people who are overweight. It s also for people with certain health conditions. During treatment, you will work with a registered dietitian to make a nutrition plan just for you.   A registered dietitian is a type of healthcare provider with special training in nutrition. This training qualifies them to give counseling on nutrition.   During medical nutrition therapy, your dietitian will look closely at your eating habits. They will help you set new nutrition goals. You will meet with your dietitian several times. They will track your progress at each visit. Your dietitian can help you set realistic weight-loss goals. Most people should aim to lose about 1 to 1.5 pounds per week.   Many people find medical nutrition therapy helpful for weight loss. Your dietitian will tell you how many calories to eat per day  to lose weight steadily and safely. They can help you plan a healthy, nutritious diet. This can help you make positive lifestyle changes that last.   Why might I need medical nutrition therapy for weight loss?   Many people know that they need to lose weight, but they are unsure how to do so. A dietitian can work with you to address your issues. Many people find this is helpful.   You may not realize that you need to avoid certain foods and eat more of others. Or maybe you are already eating the right foods, but your portion sizes are too big. Medical nutrition therapy can help you make lasting changes.   If you weigh too much, it s important that you lose weight. This is even more important if you are obese. Weighing too much increases your chance of many health problems. These include:     Diabetes    Arthritis    High blood pressure    Heart disease    Stroke    Sleep apnea    Liver disease    Infertility    Lung diseases    Certain cancers    Mental health problems  Medical nutrition therapy can also help people with other health issues. This includes people with eating disorders, people who have had bariatric (weight loss) surgery, and those with cancer or diabetes.   What are the risks of medical nutrition therapy for weight loss?   If you work closely with your dietitian and follow their advice, this type of therapy doesn t come with any risks. Your dietitian will work with you to make sure you don t lose too much weight too quickly (which might carry risks).   If you have any concerns, talk with your healthcare provider. Ask if medical nutritional therapy is right for you. If you are pregnant or have serious health issues, it may not be safe for you to lose weight.   How do I get ready for medical nutrition therapy for weight loss?   Ask your dietitian how to prepare for your first visit. Some dietitians may ask you to keep a food journal for a few days before you first meet. You should record all of the  food you eat during that time. If your dietitian asks you to do this, be honest. This will help your dietitian set realistic goals for you.   What happens during medical nutrition therapy for weight loss?   Your first appointment will likely last about an hour. You may need several follow-up visits to keep track of your progress. Follow-up visits may be shorter.   First, your dietitian will look at your current diet. This normally involves keeping a food diary for several days. Your dietitian will use this information to study your diet and determine where you need to make changes. Your dietitian s suggestions will follow the latest science.   Your dietitian will teach you how to make better food choices and work with you to achieve realistic weight-loss goals. You may get handouts or other educational materials. For many people, this is about 1 to 1.5 pounds of weight loss per week. You may also learn how to:     Read food labels    Understand how much of each nutrient, such as calcium and sodium, you need in your diet    Eat the right number of calories for you    Eat enough protein and fiber. This can make you feel gonzalez.    Eat the right variety of foods    Eat enough fruits, vegetables, and whole grains    Eat leaner cuts of meat and lower fat dairy products    Limit your intake of fried foods and other foods high in unhealthy fats    Watch your portion sizes    Drink water instead of other beverages that are high in calories (like non-diet sodas and most juices)    Increase your activity level, such as walking daily  Your dietitian may suggest using pre-prepared products, such as frozen meals. These can be a good tool to help you learn to manage portion sizes. You may talk about other topics as well, such as spotting your triggers for overeating, making strategies for coping with stress, and forming more positive thoughts about food.   Your dietitian will tailor these suggestions to your preferences and  health needs. You will need to keep portion sizes in check and reduce how often you eat certain foods. Be upfront with your dietitian about the changes you re ready to make. You should also be honest about which changes are difficult for you.   Your dietitian will carefully watch and guide you over a series of visits. They may also ask to meet with your family. This way, they can support you through the process.   What happens after medical nutrition therapy for weight loss?   How well medical nutrition therapy works depends on how much effort you put into it. Your dietitian may support you along the way and give you information and suggestions, but you must commit to making the healthy changes.   After your first series of visits with your dietitian, you can always come back for follow-up checkups. You might want to do this if your weight loss stalls or if you have trouble maintaining your weight loss.   You may also want to ask your healthcare provider for a referral to an exercise or mental health specialist. You can also ask about weight-loss support groups. Working with others can help you reach your weight-loss goals.   Weight loss works best when healthy eating habits are paired with other changes, such as getting more exercise. Talk with your healthcare provider before starting a new exercise program.   Know that losing weight takes hard work and time. Making small but significant lifestyle changes can have positive long-term effects. It can be challenging to make these changes, but the benefits are worth it.   Next steps  Before you agree to the treatment make sure you know:     The name of the test or procedure    The reason you are having the test or procedure    What results to expect and what they mean    The risks and benefits of the test or procedure    What the possible side effects or complications are    When and where you are to have the test or procedure    Who will do the test or procedure and  what that person s qualifications are    What would happen if you did not have the test or procedure    Any alternative tests or procedures to think about    When and how will you get the results Who to call after the test or procedure if you have questions or problems    How much will you have to pay for the test or procedure    8145-9812 [employer] All rights reserved. This information is not intended as a substitute for professional medical care. Always follow your healthcare professional's instructions.           Pelvic PT will call you to schedule  Colonoscopy!

## 2021-08-11 ENCOUNTER — TRANSFERRED RECORDS (OUTPATIENT)
Dept: FAMILY MEDICINE | Facility: CLINIC | Age: 46
End: 2021-08-11

## 2021-08-11 PROBLEM — E66.09 CLASS 1 OBESITY DUE TO EXCESS CALORIES WITHOUT SERIOUS COMORBIDITY WITH BODY MASS INDEX (BMI) OF 31.0 TO 31.9 IN ADULT: Status: ACTIVE | Noted: 2021-08-11

## 2021-08-11 PROBLEM — E78.5 DYSLIPIDEMIA: Status: ACTIVE | Noted: 2021-08-11

## 2021-08-11 PROBLEM — R10.2 PELVIC PAIN IN FEMALE: Status: ACTIVE | Noted: 2021-08-11

## 2021-08-11 PROBLEM — B00.1 COLD SORE: Status: ACTIVE | Noted: 2021-08-11

## 2021-08-11 PROBLEM — E66.811 CLASS 1 OBESITY DUE TO EXCESS CALORIES WITHOUT SERIOUS COMORBIDITY WITH BODY MASS INDEX (BMI) OF 31.0 TO 31.9 IN ADULT: Status: ACTIVE | Noted: 2021-08-11

## 2021-08-11 LAB
ALBUMIN SERPL-MCNC: 4.4 G/DL (ref 3.8–4.8)
ALBUMIN/GLOB SERPL: 1.9 {RATIO} (ref 1.2–2.2)
ALP SERPL-CCNC: 103 IU/L (ref 48–121)
ALT SERPL-CCNC: 50 IU/L (ref 0–32)
AST SERPL-CCNC: 40 IU/L (ref 0–40)
BILIRUB SERPL-MCNC: 0.2 MG/DL (ref 0–1.2)
BUN SERPL-MCNC: 16 MG/DL (ref 6–24)
BUN/CREATININE RATIO: 20 (ref 9–23)
CALCIUM SERPL-MCNC: 9.5 MG/DL (ref 8.7–10.2)
CHLORIDE SERPLBLD-SCNC: 103 MMOL/L (ref 96–106)
CHOLEST SERPL-MCNC: 281 MG/DL (ref 100–199)
CREAT SERPL-MCNC: 0.82 MG/DL (ref 0.57–1)
EGFR IF AFRICN AM: 98 ML/MIN/1.73
EGFR IF NONAFRICN AM: 85 ML/MIN/1.73
GLOBULIN, TOTAL: 2.3 G/DL (ref 1.5–4.5)
GLUCOSE SERPL-MCNC: 90 MG/DL (ref 65–99)
HBA1C MFR BLD: 5.5 % (ref 4.8–5.6)
HDLC SERPL-MCNC: 67 MG/DL
LDL/HDL RATIO: 2.9 RATIO (ref 0–3.2)
LDLC SERPL CALC-MCNC: 197 MG/DL (ref 0–99)
POTASSIUM SERPL-SCNC: 4.7 MMOL/L (ref 3.5–5.2)
PROT SERPL-MCNC: 6.7 G/DL (ref 6–8.5)
SODIUM SERPL-SCNC: 140 MMOL/L (ref 134–144)
TOTAL CO2: 22 MMOL/L (ref 20–29)
TRIGL SERPL-MCNC: 99 MG/DL (ref 0–149)
TSH BLD-ACNC: 2.34 UIU/ML (ref 0.45–4.5)
VLDLC SERPL CALC-MCNC: 17 MG/DL (ref 5–40)

## 2021-08-11 RX ORDER — VALACYCLOVIR HYDROCHLORIDE 1 G/1
2000 TABLET, FILM COATED ORAL 2 TIMES DAILY
Qty: 4 TABLET | Refills: 4 | Status: SHIPPED | OUTPATIENT
Start: 2021-08-11 | End: 2021-11-29

## 2021-08-11 RX ORDER — ROSUVASTATIN CALCIUM 20 MG/1
20 TABLET, COATED ORAL DAILY
Qty: 90 TABLET | Refills: 3 | Status: SHIPPED | OUTPATIENT
Start: 2021-08-11 | End: 2021-11-29

## 2021-08-11 ASSESSMENT — ANXIETY QUESTIONNAIRES: GAD7 TOTAL SCORE: 2

## 2021-09-04 ENCOUNTER — HEALTH MAINTENANCE LETTER (OUTPATIENT)
Age: 46
End: 2021-09-04

## 2021-09-17 ENCOUNTER — MYC MEDICAL ADVICE (OUTPATIENT)
Dept: FAMILY MEDICINE | Facility: CLINIC | Age: 46
End: 2021-09-17

## 2021-10-20 ENCOUNTER — TELEPHONE (OUTPATIENT)
Dept: FAMILY MEDICINE | Facility: CLINIC | Age: 46
End: 2021-10-20

## 2021-10-20 DIAGNOSIS — Z76.0 ENCOUNTER FOR MEDICATION REFILL: Primary | ICD-10-CM

## 2021-10-20 DIAGNOSIS — E66.811 CLASS 1 OBESITY DUE TO EXCESS CALORIES WITHOUT SERIOUS COMORBIDITY WITH BODY MASS INDEX (BMI) OF 31.0 TO 31.9 IN ADULT: ICD-10-CM

## 2021-10-20 DIAGNOSIS — E66.09 CLASS 1 OBESITY DUE TO EXCESS CALORIES WITHOUT SERIOUS COMORBIDITY WITH BODY MASS INDEX (BMI) OF 31.0 TO 31.9 IN ADULT: ICD-10-CM

## 2021-10-20 NOTE — TELEPHONE ENCOUNTER
Called pt left message to return call  Received a medication refill for Wegovy,  Medication is not on current list. Is she still taking this?

## 2021-10-21 RX ORDER — SEMAGLUTIDE 0.25 MG/.5ML
INJECTION, SOLUTION SUBCUTANEOUS
Qty: 1 ML | Refills: 4 | Status: SHIPPED | OUTPATIENT
Start: 2021-10-21 | End: 2022-01-27 | Stop reason: DRUGHIGH

## 2021-10-21 NOTE — TELEPHONE ENCOUNTER
Pt returned Call, stated she is still taking medication.  It was not on current medication list     Wegovy  LOV 8/10/21

## 2021-11-16 NOTE — TELEPHONE ENCOUNTER
Approval letter came through on 11/08 with dates of  10/09/21-12/08/2021 Called [facility] [phone]  to see why PA expires this year.  Spoke to pharmacy help desk who was able to approve 4 pens within the dates of 08/21/21-04/18/22.  Case # [id number].

## 2021-11-29 ENCOUNTER — OFFICE VISIT (OUTPATIENT)
Dept: FAMILY MEDICINE | Facility: CLINIC | Age: 46
End: 2021-11-29

## 2021-11-29 VITALS
RESPIRATION RATE: 16 BRPM | WEIGHT: 164 LBS | HEIGHT: 62 IN | HEART RATE: 82 BPM | BODY MASS INDEX: 30.18 KG/M2 | SYSTOLIC BLOOD PRESSURE: 128 MMHG | DIASTOLIC BLOOD PRESSURE: 88 MMHG | OXYGEN SATURATION: 99 %

## 2021-11-29 DIAGNOSIS — Z23 NEED FOR INFLUENZA VACCINATION: ICD-10-CM

## 2021-11-29 DIAGNOSIS — E78.5 DYSLIPIDEMIA: ICD-10-CM

## 2021-11-29 DIAGNOSIS — E66.09 CLASS 1 OBESITY DUE TO EXCESS CALORIES WITH SERIOUS COMORBIDITY AND BODY MASS INDEX (BMI) OF 30.0 TO 30.9 IN ADULT: Primary | ICD-10-CM

## 2021-11-29 DIAGNOSIS — E66.811 CLASS 1 OBESITY DUE TO EXCESS CALORIES WITH SERIOUS COMORBIDITY AND BODY MASS INDEX (BMI) OF 30.0 TO 30.9 IN ADULT: Primary | ICD-10-CM

## 2021-11-29 PROCEDURE — 90471 IMMUNIZATION ADMIN: CPT | Mod: 59 | Performed by: NURSE PRACTITIONER

## 2021-11-29 PROCEDURE — 99213 OFFICE O/P EST LOW 20 MIN: CPT | Mod: 25 | Performed by: NURSE PRACTITIONER

## 2021-11-29 PROCEDURE — 36415 COLL VENOUS BLD VENIPUNCTURE: CPT | Performed by: NURSE PRACTITIONER

## 2021-11-29 PROCEDURE — 90686 IIV4 VACC NO PRSV 0.5 ML IM: CPT | Performed by: NURSE PRACTITIONER

## 2021-11-29 RX ORDER — SEMAGLUTIDE 1 MG/.5ML
1 INJECTION, SOLUTION SUBCUTANEOUS
Qty: 2 ML | Refills: 0 | Status: SHIPPED | OUTPATIENT
Start: 2021-11-29 | End: 2022-01-27 | Stop reason: DRUGHIGH

## 2021-11-29 ASSESSMENT — MIFFLIN-ST. JEOR: SCORE: 1327.15

## 2021-11-29 NOTE — PATIENT INSTRUCTIONS
Continue the Wegovy- go to the 1mg dosing for four weeks after this week's dose. Update me after the third 1mg dose on MyChart- if you're doing well, I'll bump up the dose to 1.7mg weekly.    See me back in January-ish and we will do your physical and your Pap!    We'll check the cholesterol again at that time as well

## 2021-11-30 LAB
BUN SERPL-MCNC: 12 MG/DL (ref 6–24)
BUN/CREATININE RATIO: 17 (ref 9–23)
CALCIUM SERPL-MCNC: 9.5 MG/DL (ref 8.7–10.2)
CHLORIDE SERPLBLD-SCNC: 101 MMOL/L (ref 96–106)
CREAT SERPL-MCNC: 0.69 MG/DL (ref 0.57–1)
EGFR IF AFRICN AM: 119 ML/MIN/1.73
EGFR IF NONAFRICN AM: 103 ML/MIN/1.73
GLUCOSE SERPL-MCNC: 83 MG/DL (ref 65–99)
POTASSIUM SERPL-SCNC: 4.2 MMOL/L (ref 3.5–5.2)
SODIUM SERPL-SCNC: 138 MMOL/L (ref 134–144)
TOTAL CO2: 24 MMOL/L (ref 20–29)

## 2021-12-20 DIAGNOSIS — E66.09 CLASS 1 OBESITY DUE TO EXCESS CALORIES WITH SERIOUS COMORBIDITY AND BODY MASS INDEX (BMI) OF 30.0 TO 30.9 IN ADULT: Primary | ICD-10-CM

## 2021-12-20 DIAGNOSIS — E66.811 CLASS 1 OBESITY DUE TO EXCESS CALORIES WITH SERIOUS COMORBIDITY AND BODY MASS INDEX (BMI) OF 30.0 TO 30.9 IN ADULT: Primary | ICD-10-CM

## 2021-12-20 RX ORDER — SEMAGLUTIDE 1.7 MG/.75ML
1.7 INJECTION, SOLUTION SUBCUTANEOUS
Qty: 3 ML | Refills: 0 | Status: SHIPPED | OUTPATIENT
Start: 2021-12-20 | End: 2022-01-27

## 2022-01-26 NOTE — PATIENT INSTRUCTIONS
Preventive Health Recommendations  Female Ages 40 to 49    Yearly exam:     See your health care provider every year in order to  1. Review health changes.   2. Discuss preventive care.    3. Review your medicines if your doctor prescribed any.      Get a Pap test every three years (unless you have an abnormal result and your provider advises testing more often).      If you get Pap tests with HPV test, you only need to test every 5 years, unless you have an abnormal result. You do not need a Pap test if your uterus was removed (hysterectomy) and you have not had cancer.      You should be tested each year for STDs (sexually transmitted diseases), if you're at risk.     Ask your doctor if you should have a mammogram.      Have a colonoscopy (test for colon cancer) if someone in your family has had colon cancer or polyps before age 50.       Have a cholesterol test every 5 years.       Have a diabetes test (fasting glucose) after age 45. If you are at risk for diabetes, you should have this test every 3 years.    Shots: Get a flu shot each year. Get a tetanus shot every 10 years.     Nutrition:     Eat at least 5 servings of fruits and vegetables each day.    Eat whole-grain bread, whole-wheat pasta and brown rice instead of white grains and rice.    Get adequate Calcium and Vitamin D.      Lifestyle    Exercise at least 150 minutes a week (an average of 30 minutes a day, 5 days a week). This will help you control your weight and prevent disease.    Limit alcohol to one drink per day.    No smoking.     Wear sunscreen to prevent skin cancer.    See your dentist every six months for an exam and cleaning.      Silicone based lubricant with intercourse. Consider an over the counter vaginal moisturizer such as Hyalo-Gyn, Luvena, or Sutil (available on the internet) a few times per week for a baseline moisture. If you would like to pursue a vaginal estradiol cream, please let me know!   If you'd like to see pelvic PT,  please let me know    Pap and labs today  Colonoscopy ordered  Mammogram in the summer

## 2022-01-26 NOTE — PROGRESS NOTES
SUBJECTIVE:   CC: Dione Villa is an 47 year old woman who presents for preventive health visit.     #1) Weight management: On Wegovy, working on adding in more strength training. Has nausea and a headache when she increases the dose, but this is short lived and manageable by taking this at night. Stress eating has decreased.  #2) Longstanding history of pelvic pain and dyspareunia. Hx of endometriosis with multiple surgeries. Amenorrheic for several years in her 30s, final menstrual period at 40. Did not start HRT, declines as she is concerned about risks with HRT. Has had bone density testing in the past.      Last Pap/HPV: due  Last mammogram: 7/8/21  Last DEXA: done through OB  Last colonoscopy: never done, due    Patient has been advised of split billing requirements and indicates understanding: Yes  Healthy Habits:    Do you get at least three servings of calcium containing foods daily (dairy, green leafy vegetables, etc.)? yes    Amount of exercise or daily activities, outside of work: 3 day(s) per week- cross country skiing and walking and strength training    Problems taking medications regularly No    Medication side effects: No    Have you had an eye exam in the past two years? no    Do you see a dentist twice per year? yes    Do you have sleep apnea, excessive snoring or daytime drowsiness?no    Today's PHQ-2 Score:   PHQ-2 ( 1999 Pfizer) 1/27/2022 11/29/2021   Q1: Little interest or pleasure in doing things 0 0   Q2: Feeling down, depressed or hopeless 1 0   PHQ-2 Score 1 0     Abuse: Current or Past(Physical, Sexual or Emotional)- No  Do you feel safe in your environment? Yes    Have you ever done Advance Care Planning? (For example, a Health Directive, POLST, or a discussion with a medical provider or your loved ones about your wishes): No, advance care planning information given to patient to review.  Patient plans to discuss their wishes with loved ones or provider.      Social History      Tobacco Use     Smoking status: Former Smoker     Packs/day: 0.25     Types: Cigarettes     Start date:      Quit date: 1999     Years since quittin.6     Smokeless tobacco: Never Used     Tobacco comment: was social not every day smoker   Substance Use Topics     Alcohol use: Not Currently     Alcohol/week: 0.0 - 1.0 standard drinks     Comment: social maybe per month     If you drink alcohol do you typically have >3 drinks per day or >7 drinks per week? No                     Reviewed orders with patient.  Reviewed health maintenance and updated orders accordingly - Yes  Lab work is in process  Labs reviewed in EPIC  BP Readings from Last 3 Encounters:   22 122/84   21 128/88   08/10/21 (!) 126/98    Wt Readings from Last 3 Encounters:   22 67.2 kg (148 lb 3.2 oz)   21 74.4 kg (164 lb)   08/10/21 77.1 kg (170 lb)                  Patient Active Problem List   Diagnosis     Dyspnea     Health Care Home     History of melanoma excision     Hormone imbalance     Endometriosis     Pelvic pain in female     Cold sore     Dyslipidemia     Past Surgical History:   Procedure Laterality Date     ABDOMEN SURGERY      6 laproscopic for endometriosis     CHOLECYSTECTOMY  2002     SURGICAL PATHOLOGY EXAM      melanoma excision 2008       Social History     Tobacco Use     Smoking status: Former Smoker     Packs/day: 0.25     Types: Cigarettes     Start date:      Quit date: 1999     Years since quittin.6     Smokeless tobacco: Never Used     Tobacco comment: was social not every day smoker   Substance Use Topics     Alcohol use: Not Currently     Alcohol/week: 0.0 - 1.0 standard drinks     Comment: social maybe per month     Family History   Problem Relation Age of Onset     Hypertension Mother      Cancer Father         Multiple myeloma     Heart Disease Father      Breast Cancer Sister 42     No Known Problems Other      Unknown/Adopted Daughter         adopted children  "    Unknown/Adopted Son         adopted children         Current Outpatient Medications   Medication Sig Dispense Refill     Chlorpheniramine-PSE-Ibuprofen (ADVIL ALLERGY SINUS PO) Take by mouth daily as needed       Multiple Vitamins-Minerals (DAILY MULTI PO) Take 1 tablet by mouth daily.       Semaglutide-Weight Management (WEGOVY) 1.7 MG/0.75ML SOAJ Inject 1.7 mg Subcutaneous every 7 days 9 mL 3     Vitamin D3 (CHOLECALCIFEROL) 25 mcg (1000 units) tablet Take by mouth daily       zinc gluconate 50 MG tablet Take 50 mg by mouth daily       Allergies   Allergen Reactions     Sulfa Drugs Rash     Recent Labs   Lab Test 21  1145 08/10/21  1030 10/18/18  0938 13  1249   A1C  --  5.5  --   --    LDL  --  197* 177*  --    HDL  --  67 63  --    TRIG  --  99 100  --    ALT  --  50* 18 45*   CR 0.69 0.82 0.88 0.80   POTASSIUM 4.2 4.7 4.6 4.3         Reviewed and updated as needed this visit by clinical staff  Tobacco  Allergies  Meds  Problems  Med Hx  Surg Hx  Fam Hx       Reviewed and updated as needed this visit by Provider  Tobacco  Allergies  Meds  Problems  Med Hx  Surg Hx  Fam Hx        Past Medical History:   Diagnosis Date     Class 1 obesity due to excess calories without serious comorbidity with body mass index (BMI) of 31.0 to 31.9 in adult 2021     Endometriosis      Malignant melanoma nos 7-    right arm      Past Surgical History:   Procedure Laterality Date     ABDOMEN SURGERY      6 laproscopic for endometriosis     CHOLECYSTECTOMY       SURGICAL PATHOLOGY EXAM      melanoma excision      OB History    Para Term  AB Living   0 0 0 0 0 0   SAB IAB Ectopic Multiple Live Births   0 0 0 0 0       ROS:  Gen, HEENT, breast, CV, resp, GI, , MSK, heme/lymph, endo, skin, neuro, psych negative except as listed per HPI    OBJECTIVE:     Physical Exam:    /84   Pulse 84   Ht 1.575 m (5' 2\")   Wt 67.2 kg (148 lb 3.2 oz)   SpO2 98%   BMI 27.11 kg/m  "     CONSTITUTIONAL: Alert non-toxic appearing female in no acute distress  HEAD: Normocephalic, atraumatic  EYES: PERRLA; no scleral icterus; sclera without injection  ENT: EACs clear bilaterally, TMs pearly gray and intact with good visualization of bony landmarks and cone of light, no bulging or erythema; nares patent without ulceration or edema; oropharynx pink without lesions; posterior pharynx without exudates  NECK: Neck supple without lymphadenopathy, thyroid without enlargement or nodularity  BREAST: Breasts without fixed, dominant, or suspicious masses. Nipples without spontaneous discharge, retraction, or inversion. No rashes or lesions. No axillary adenopathy.  RESPIRATORY: Lungs clear to auscultation, respirations unlabored  CV: Regular rate and rhythm, S1S2, no clicks, murmurs, rubs, or gallops; bilateral lower extremities without edema, posterior tibialis pulses 2+ bilaterally  GASTROINTESTINAL: Normoactive bowel sounds x4 quadrants, abdomen soft, non-distended, and non-tender to palpation without masses or organomegaly  GENITOURINARY: External genitalia and urethral meatus pink without lesions, masses, or excoriation. Vagina hypoestrogenic. Cervix without masses or lesions, os without bleeding or discharge. Uterus without palpable abnormality. Bimanual exam reveals no masses, fullness, or cervical motion tenderness.  MUSCULOSKELETAL: Moves all extremities, no obvious muscle wasting  NEUROLOGIC: No gross deficits, normal gait  SKIN: Appropriate color for race, warm and dry, no suspicious lesions or rashes  PSYCHIATRIC: Pleasant and interactive, affect euthymic, makes appropriate eye contact, thought process logical      Diagnostic Test Results:  Labs reviewed in Epic  No results found for this or any previous visit (from the past 24 hour(s)).    ASSESSMENT/PLAN:   Dione was seen today for physical.    Diagnoses and all orders for this visit:    Routine general medical examination at a [facility]  facility    Well appearing 47 year old female. See below for counseling.    Cervical cancer screening  -     Pap IG HPV rfx HPV 16 and 18,45 ([facility])    Colon cancer screening  -     Adult Gastro Ref - Procedure Only; Future    Screening for lipoid disorders  -     Lipid Panel ([facility])  -     VENOUS COLLECTION    Screening for endocrine, metabolic and immunity disorder  -     Comp. Metabolic Panel (14) ([facility])  -     VENOUS COLLECTION    Overweight (BMI 25.0-29.9)  -     Semaglutide-Weight Management (WEGOVY) 1.7 MG/0.75ML SOAJ; Inject 1.7 mg Subcutaneous every 7 days    Has had significant weight loss while on semaglutide, in conjunction with increased activity and changes in dietary patterns. Feeling well on current dose, continue semaglutide 1.7mg weekly. Recheck in six months, earlier with concerns.    Pelvic pain in female    Discussed hx of endometriosis, several laparoscopies, in addition to genitourinary syndrome of menopause- declines pelvic PT at this time. Discussed vaginal moisturizers vs topical estradiol- will start with vaginal moisturizers and update me if she would like to try vaginal estradiol. Trial of silicone based lubricant with intercourse.      Patient has been advised of split billing requirements and indicates understanding: Yes  COUNSELING:   Reviewed preventive health counseling, as reflected in patient instructions  Special attention given to:        Regular exercise       Healthy diet/nutrition       Immunizations    Up to date           Osteoporosis prevention/bone health       Colorectal Cancer Screening       (Violet)menopause management       The 10-year ASCVD risk score (Rosalio MCCANN Jr., et al., 2013) is: 1.3%    Values used to calculate the score:      Age: 47 years      Sex: Female      Is Non- : No      Diabetic: No      Tobacco smoker: No      Systolic Blood Pressure: 122 mmHg      Is BP treated: No      HDL Cholesterol: 67 mg/dL      Total Cholesterol:  "281 mg/dL    Estimated body mass index is 27.11 kg/m  as calculated from the following:    Height as of this encounter: 1.575 m (5' 2\").    Weight as of this encounter: 67.2 kg (148 lb 3.2 oz).    Weight management plan: Discussed healthy diet and exercise guidelines Commended on weight loss thus far    She reports that she quit smoking about 22 years ago. Her smoking use included cigarettes. She started smoking about 31 years ago. She smoked 0.25 packs per day. She has never used smokeless tobacco.      Counseling Resources:  ATP IV Guidelines  Pooled Cohorts Equation Calculator  Breast Cancer Risk Calculator  BRCA-Related Cancer Risk Assessment: FHS-7 Tool  FRAX Risk Assessment  ICSI Preventive Guidelines  Dietary Guidelines for Americans, 2010  USDA's MyPlate  ASA Prophylaxis  Lung CA Screening    SHAINA Arroyo [facility]  " 162.56

## 2022-01-27 ENCOUNTER — OFFICE VISIT (OUTPATIENT)
Dept: FAMILY MEDICINE | Facility: CLINIC | Age: 47
End: 2022-01-27

## 2022-01-27 VITALS
BODY MASS INDEX: 27.27 KG/M2 | HEIGHT: 62 IN | HEART RATE: 84 BPM | SYSTOLIC BLOOD PRESSURE: 122 MMHG | DIASTOLIC BLOOD PRESSURE: 84 MMHG | OXYGEN SATURATION: 98 % | WEIGHT: 148.2 LBS

## 2022-01-27 DIAGNOSIS — Z12.11 COLON CANCER SCREENING: ICD-10-CM

## 2022-01-27 DIAGNOSIS — E66.3 OVERWEIGHT (BMI 25.0-29.9): ICD-10-CM

## 2022-01-27 DIAGNOSIS — Z13.0 SCREENING FOR ENDOCRINE, METABOLIC AND IMMUNITY DISORDER: ICD-10-CM

## 2022-01-27 DIAGNOSIS — R10.2 PELVIC PAIN IN FEMALE: ICD-10-CM

## 2022-01-27 DIAGNOSIS — Z13.29 SCREENING FOR ENDOCRINE, METABOLIC AND IMMUNITY DISORDER: ICD-10-CM

## 2022-01-27 DIAGNOSIS — Z13.220 SCREENING FOR LIPOID DISORDERS: ICD-10-CM

## 2022-01-27 DIAGNOSIS — Z13.228 SCREENING FOR ENDOCRINE, METABOLIC AND IMMUNITY DISORDER: ICD-10-CM

## 2022-01-27 DIAGNOSIS — Z12.4 CERVICAL CANCER SCREENING: ICD-10-CM

## 2022-01-27 DIAGNOSIS — Z00.00 ROUTINE GENERAL MEDICAL EXAMINATION AT A HEALTH CARE FACILITY: Primary | ICD-10-CM

## 2022-01-27 PROBLEM — E66.811 CLASS 1 OBESITY DUE TO EXCESS CALORIES WITHOUT SERIOUS COMORBIDITY WITH BODY MASS INDEX (BMI) OF 31.0 TO 31.9 IN ADULT: Status: RESOLVED | Noted: 2021-08-11 | Resolved: 2022-01-27

## 2022-01-27 PROBLEM — E66.09 CLASS 1 OBESITY DUE TO EXCESS CALORIES WITHOUT SERIOUS COMORBIDITY WITH BODY MASS INDEX (BMI) OF 31.0 TO 31.9 IN ADULT: Status: RESOLVED | Noted: 2021-08-11 | Resolved: 2022-01-27

## 2022-01-27 PROCEDURE — 36415 COLL VENOUS BLD VENIPUNCTURE: CPT | Performed by: NURSE PRACTITIONER

## 2022-01-27 PROCEDURE — 99396 PREV VISIT EST AGE 40-64: CPT | Performed by: NURSE PRACTITIONER

## 2022-01-27 RX ORDER — ZINC GLUCONATE 50 MG
50 TABLET ORAL DAILY
COMMUNITY

## 2022-01-27 RX ORDER — SEMAGLUTIDE 1.7 MG/.75ML
1.7 INJECTION, SOLUTION SUBCUTANEOUS
Qty: 9 ML | Refills: 3 | Status: SHIPPED | OUTPATIENT
Start: 2022-01-27 | End: 2022-03-28

## 2022-01-27 RX ORDER — VITAMIN B COMPLEX
TABLET ORAL DAILY
COMMUNITY

## 2022-01-27 ASSESSMENT — MIFFLIN-ST. JEOR: SCORE: 1250.48

## 2022-01-29 LAB
ALBUMIN SERPL-MCNC: 4.6 G/DL (ref 3.8–4.8)
ALBUMIN/GLOB SERPL: 1.8 {RATIO} (ref 1.2–2.2)
ALP SERPL-CCNC: 86 IU/L (ref 44–121)
ALT SERPL-CCNC: 16 IU/L (ref 0–32)
AST SERPL-CCNC: 18 IU/L (ref 0–40)
BILIRUB SERPL-MCNC: 0.2 MG/DL (ref 0–1.2)
BUN SERPL-MCNC: 12 MG/DL (ref 6–24)
BUN/CREATININE RATIO: 16 (ref 9–23)
CALCIUM SERPL-MCNC: 9.3 MG/DL (ref 8.7–10.2)
CHLORIDE SERPLBLD-SCNC: 98 MMOL/L (ref 96–106)
CHOLEST SERPL-MCNC: 240 MG/DL (ref 100–199)
CREAT SERPL-MCNC: 0.73 MG/DL (ref 0.57–1)
EGFR IF AFRICN AM: 112 ML/MIN/1.73
EGFR IF NONAFRICN AM: 97 ML/MIN/1.73
GLOBULIN, TOTAL: 2.6 G/DL (ref 1.5–4.5)
GLUCOSE SERPL-MCNC: 75 MG/DL (ref 65–99)
HDLC SERPL-MCNC: 61 MG/DL
LDL/HDL RATIO: 2.6 RATIO (ref 0–3.2)
LDLC SERPL CALC-MCNC: 161 MG/DL (ref 0–99)
POTASSIUM SERPL-SCNC: 4 MMOL/L (ref 3.5–5.2)
PROT SERPL-MCNC: 7.2 G/DL (ref 6–8.5)
SODIUM SERPL-SCNC: 138 MMOL/L (ref 134–144)
TOTAL CO2: 21 MMOL/L (ref 20–29)
TRIGL SERPL-MCNC: 104 MG/DL (ref 0–149)
VLDLC SERPL CALC-MCNC: 18 MG/DL (ref 5–40)

## 2022-02-01 LAB
.: NORMAL
DIAGNOSIS:: NORMAL
HPV APTIMA: NEGATIVE
Lab: NORMAL
PERFORMED BY:: NORMAL
SOURCE: NORMAL
SPECIMEN ADEQUACY:: NORMAL
TEST METHODOLOGY:: NORMAL

## 2022-02-03 DIAGNOSIS — R87.615 UNSATISFACTORY CERVICAL PAPANICOLAOU SMEAR: Primary | ICD-10-CM

## 2022-03-10 ENCOUNTER — TRANSFERRED RECORDS (OUTPATIENT)
Dept: FAMILY MEDICINE | Facility: CLINIC | Age: 47
End: 2022-03-10

## 2022-04-21 ENCOUNTER — MYC MEDICAL ADVICE (OUTPATIENT)
Dept: FAMILY MEDICINE | Facility: CLINIC | Age: 47
End: 2022-04-21

## 2022-04-21 DIAGNOSIS — E66.3 OVERWEIGHT (BMI 25.0-29.9): ICD-10-CM

## 2022-04-25 NOTE — TELEPHONE ENCOUNTER
Per Cristel MERRITT CNP prescription sent to pharmacy for 1.7mg weekly dose Wegovy. Will do prior authorization if required as patient has tried and failed 2.4mg weekly dose. Sofía Monge

## 2022-07-11 ENCOUNTER — OFFICE VISIT (OUTPATIENT)
Dept: FAMILY MEDICINE | Facility: CLINIC | Age: 47
End: 2022-07-11

## 2022-07-11 VITALS
HEART RATE: 60 BPM | BODY MASS INDEX: 26.96 KG/M2 | OXYGEN SATURATION: 97 % | RESPIRATION RATE: 16 BRPM | SYSTOLIC BLOOD PRESSURE: 124 MMHG | DIASTOLIC BLOOD PRESSURE: 80 MMHG | WEIGHT: 147.38 LBS

## 2022-07-11 DIAGNOSIS — Z12.11 COLON CANCER SCREENING: ICD-10-CM

## 2022-07-11 DIAGNOSIS — E66.3 OVERWEIGHT (BMI 25.0-29.9): Primary | ICD-10-CM

## 2022-07-11 DIAGNOSIS — E78.5 DYSLIPIDEMIA: ICD-10-CM

## 2022-07-11 LAB
CHOL/HDL RATIO (RMG): 3.4 MG/DL (ref 0–4.5)
CHOLESTEROL: 218 MG/DL (ref 100–199)
HDL (RMG): 64 MG/DL (ref 40–?)
LDL CALCULATED (RMG): 137 MG/DL (ref 0–130)
TRIGLYCERIDES (RMG): 85 MG/DL (ref 0–149)

## 2022-07-11 PROCEDURE — 99213 OFFICE O/P EST LOW 20 MIN: CPT | Performed by: NURSE PRACTITIONER

## 2022-07-11 PROCEDURE — 36415 COLL VENOUS BLD VENIPUNCTURE: CPT | Performed by: NURSE PRACTITIONER

## 2022-07-11 PROCEDURE — 80061 LIPID PANEL: CPT | Mod: QW | Performed by: NURSE PRACTITIONER

## 2022-07-11 RX ORDER — HYDROXYZINE PAMOATE 25 MG/1
CAPSULE ORAL
COMMUNITY
Start: 2022-03-17 | End: 2022-07-11

## 2022-07-11 NOTE — PROGRESS NOTES
"Problem(s) Oriented visit        SUBJECTIVE:                                                    Dione Villa is a 47 year old female who presents today for the following:    Weight management: Had been on semaglutide 1.7mg weekly and weight went from 170lb down to 140lb at home, lowest in April 2022. Increased the semaglutide dose to 2.4mg weekly and this was associated with severe headache, nausea, and vomiting- denies any other systemic symptoms or obvious causes of her symptoms. Went back to 1.7mg dosing and tolerated this well. However, insurance will not cover semaglutide if she does not tolerate the 2.4mg dose. She is frustrated with this as semaglutide 1.7mg weekly has been well tolerated and she has been able to lose a significant amt of weight with this. Since being off the past three months, she has regained 7lb and she is worried she will continue to gain more weight. She is biking 15 miles 2-3 times per week, running/walking, and strength training. Working on healthy diet- trying to intermittent fast in an eight hour period. Trying to increase fiber. Eating veggies, protein, carbs. Cut out a little bit of bread. Reports \"a huge sweet tooth\"- reports semaglutide significantly reduced cravings for sweets. Hopeful to continue semaglutide. Associated comorbidity of hyperlipidemia, declined statin, working on this with diet and exercise. Blood pressure has improved with weight control as well.      ROS:  Gen, GI, endo negative except as listed per HPI      OBJECTIVE:                                                    Physical Exam:    /80   Pulse 60   Resp 16   Wt 66.8 kg (147 lb 6 oz)   SpO2 97%   BMI 26.96 kg/m      CONSTITUTIONAL: Alert non-toxic appearing female in no acute distress  RESPIRATORY: Lungs clear to auscultation, respirations unlabored  CV: Regular rate and rhythm, S1S2, no clicks, murmurs, rubs, or gallops; BLE without edema  NEUROLOGIC: No gross deficits  PSYCHIATRIC: Pleasant " and interactive, affect euthymic, makes appropriate eye contact, thought process logical       ASSESSMENT/PLAN:                                                          Dione was seen today for recheck medication.    Diagnoses and all orders for this visit:    Overweight (BMI 25.0-29.9)  -     Basic Metabolic Panel (8) ([facility])  -     Hemoglobin A1C ([facility])  -     Lipid Profile ([facility])  -     VENOUS COLLECTION    Had great success with semaglutide 1.7mg weekly, has regained 7lb since being off of this despite working on continued healthy lifestyle changes. Appeal to insurance company filed. Discussed the amazing changes she has made for her well being and commended on this. Reviewed other options as well if she continues to gain weight. Checking labs again to eval for comorbid conditions.    Dyslipidemia  -     Lipid Profile ([facility])    Lipid panel improved with exercise and healthy diet    Colon cancer screening  -     Adult GI  Referral - Procedure Only; Future  -     VENOUS COLLECTION              Patient Instructions   Labs- we will see if cholesterol has gone down and what the A1C looks like    If BMI hits 27 or above, we can discuss other medical options- this would be about 150-152lb, but remember that this is only a number and does not tell us about many other things     Keep up the AMAZING work on your exercise regimen! Strength training is super important.     Colonoscopy ordered- call your insurance to make sure [facility] is in network        SHAINA Arroyo CNP  [facility]  Family Practice  [facility]  [phone]    For any issues my office # is [phone]

## 2022-07-11 NOTE — PATIENT INSTRUCTIONS
Labs- we will see if cholesterol has gone down and what the A1C looks like    If BMI hits 27 or above, we can discuss other medical options- this would be about 150-152lb, but remember that this is only a number and does not tell us about many other things     Keep up the AMAZING work on your exercise regimen! Strength training is super important.     Colonoscopy ordered- call your insurance to make sure [facility] is in network

## 2022-07-12 LAB
BUN SERPL-MCNC: 13 MG/DL (ref 6–24)
BUN/CREATININE RATIO: 20 (ref 9–23)
CALCIUM SERPL-MCNC: 9 MG/DL (ref 8.7–10.2)
CHLORIDE SERPLBLD-SCNC: 102 MMOL/L (ref 96–106)
CREAT SERPL-MCNC: 0.66 MG/DL (ref 0.57–1)
EGFR: 107 ML/MIN/1.73
GLUCOSE SERPL-MCNC: 92 MG/DL (ref 65–99)
HBA1C MFR BLD: 5.4 % (ref 4.8–5.6)
POTASSIUM SERPL-SCNC: 4.2 MMOL/L (ref 3.5–5.2)
SODIUM SERPL-SCNC: 139 MMOL/L (ref 134–144)
TOTAL CO2: 24 MMOL/L (ref 20–29)

## 2022-08-17 VITALS — WEIGHT: 150 LBS | BODY MASS INDEX: 27.6 KG/M2 | HEIGHT: 62 IN

## 2022-08-17 DIAGNOSIS — E78.5 DYSLIPIDEMIA: ICD-10-CM

## 2022-08-17 DIAGNOSIS — E66.3 OVERWEIGHT (BMI 25.0-29.9): Primary | ICD-10-CM

## 2022-08-17 NOTE — PROGRESS NOTES
"Called Dione regarding her MyChart-she has been on semaglutide for obesity management and had been quite successful with this. However she was unable to tolerate the 2.4 mg dosing and lower doses were not approved for her insurance and it was cost prohibitive. Since being off of semaglutide she has started to regain weight again, despite healthy diet and exercise. She tolerated the 1.7 mg dose of semaglutide but again insurance will not approve this lower dosing for her. We discussed other options for weight management, including the daily liraglutide and new data on tirzepatide. She is hoping to avoid a once daily injection. We reviewed the available data surrounding tirzepatide and that it is not yet approved for weight management. However, data is demonstrating significant weight loss comparable to or better than GLP1 agonists. We reviewed risks with tirzepatide, including the risk of thyroid cancer seen in rats. She verbalizes understanding of and risks associated with this and would like to proceed. Will start with tirzepatide 2.5mg once weekly x4 weeks and adjust from there if tolerated. To see me back in 2-4 weeks, earlier with concerns.    Ht 1.575 m (5' 2\")   Wt 68 kg (150 lb)   BMI 27.44 kg/m      Diagnoses and all orders for this visit:    Overweight (BMI 25.0-29.9)  -     Tirzepatide 2.5 MG/0.5ML SOPN; Inject 2.5 mg Subcutaneous every 7 days    Dyslipidemia  -     Tirzepatide 2.5 MG/0.5ML SOPN; Inject 2.5 mg Subcutaneous every 7 days        "

## 2022-08-18 ENCOUNTER — TELEPHONE (OUTPATIENT)
Dept: FAMILY MEDICINE | Facility: CLINIC | Age: 47
End: 2022-08-18

## 2022-08-18 NOTE — TELEPHONE ENCOUNTER
PRIOR AUTHORIZATION DENIED    Medication: Tirzepatide 2.5 MG/0.5ML SOLN - EPA DENIED    Denial Date: 8/18/2022     Denial Rational: not covered for DX      Appeal Information:

## 2022-09-19 ENCOUNTER — OFFICE VISIT (OUTPATIENT)
Dept: FAMILY MEDICINE | Facility: CLINIC | Age: 47
End: 2022-09-19

## 2022-09-19 VITALS
HEART RATE: 66 BPM | OXYGEN SATURATION: 99 % | WEIGHT: 144.8 LBS | DIASTOLIC BLOOD PRESSURE: 70 MMHG | SYSTOLIC BLOOD PRESSURE: 124 MMHG | BODY MASS INDEX: 26.48 KG/M2

## 2022-09-19 DIAGNOSIS — N95.8 GENITOURINARY SYNDROME OF MENOPAUSE: ICD-10-CM

## 2022-09-19 DIAGNOSIS — E78.5 DYSLIPIDEMIA: ICD-10-CM

## 2022-09-19 DIAGNOSIS — E66.3 OVERWEIGHT (BMI 25.0-29.9): Primary | ICD-10-CM

## 2022-09-19 DIAGNOSIS — Z12.11 COLON CANCER SCREENING: ICD-10-CM

## 2022-09-19 DIAGNOSIS — Z23 NEED FOR INFLUENZA VACCINATION: ICD-10-CM

## 2022-09-19 DIAGNOSIS — R11.0 NAUSEA: ICD-10-CM

## 2022-09-19 PROCEDURE — 36415 COLL VENOUS BLD VENIPUNCTURE: CPT | Performed by: NURSE PRACTITIONER

## 2022-09-19 PROCEDURE — 90674 CCIIV4 VAC NO PRSV 0.5 ML IM: CPT | Performed by: NURSE PRACTITIONER

## 2022-09-19 PROCEDURE — 90471 IMMUNIZATION ADMIN: CPT | Mod: 59 | Performed by: NURSE PRACTITIONER

## 2022-09-19 PROCEDURE — 99214 OFFICE O/P EST MOD 30 MIN: CPT | Mod: 25 | Performed by: NURSE PRACTITIONER

## 2022-09-19 RX ORDER — ONDANSETRON 4 MG/1
4 TABLET, FILM COATED ORAL EVERY 6 HOURS PRN
Qty: 20 TABLET | Refills: 1 | Status: SHIPPED | OUTPATIENT
Start: 2022-09-19 | End: 2024-09-18

## 2022-09-19 RX ORDER — ESTRADIOL 0.1 MG/G
2 CREAM VAGINAL
Qty: 42.5 G | Refills: 1 | Status: SHIPPED | OUTPATIENT
Start: 2022-09-19 | End: 2023-07-14 | Stop reason: ALTCHOICE

## 2022-09-19 NOTE — PATIENT INSTRUCTIONS
Starting estradiol- twice weekly at bedtime, let us know if it is cost prohibitive or doesn't work- watch for vaginal bleeding and let us know.    Luvena, Sutil, Hyalo-Gyn are all good over the counter and non-hormonal options available online.    Trial of ondansetron Wednesday evenings, then overnight if you wake up nauseated, then Thursday mornings    Mounjaro continued at 2.5mg weekly, but let us know if tolerance changes

## 2022-09-19 NOTE — PROGRESS NOTES
Problem(s) Oriented visit        SUBJECTIVE:                                                    Dione Villa is a 47 year old female who presents today for the following:    #1) Weight management: On tirzepatide 2.5mg weekly- tolerating this much better than semaglutide. Tends to have nausea 72 hours after the dose. Has not vomited. Lasts about 12 hours. A little constipated, but then ups the fiber and the water. Sniffing rubbing alcohol helped. No sugar cravings anymore- states they are completely gone. Minimal alcohol- states her brain just doesn't want anymore than half a glass every now and then. Walks every single day, doing [employer] arms workouts, strength training, riding bike. No fevers, chills, or abdominal pain.    Wt Readings from Last 5 Encounters:   09/19/22 65.7 kg (144 lb 12.8 oz)   08/17/22 68 kg (150 lb)   07/11/22 66.8 kg (147 lb 6 oz)   01/27/22 67.2 kg (148 lb 3.2 oz)   11/29/21 74.4 kg (164 lb)     #2) GSM: Continues with vaginal dryness causing painful sex. Some improvement with UberLube, but feels she needs a better baseline rather than just an as needed intervention. Post-menopausal. Denies vaginal bleeding.    Answers for HPI/ROS submitted by the patient on 9/16/2022  What is the reason for your visit today? : Follow up for medication check  How many servings of fruits and vegetables do you eat daily?: 2-3  On average, how many sweetened beverages do you drink each day (Examples: soda, juice, sweet tea, etc.  Do NOT count diet or artificially sweetened beverages)?: 0  How many minutes a day do you exercise enough to make your heart beat faster?: 20 to 29  How many days a week do you exercise enough to make your heart beat faster?: 4  How many days per week do you miss taking your medication?: 0      ROS:  Gen, GI,  negative except as listed per HPI      OBJECTIVE:                                                    Physical Exam:    /70   Pulse 66   Wt 65.7 kg (144 lb 12.8 oz)    SpO2 99%   BMI 26.48 kg/m      CONSTITUTIONAL: Alert non-toxic appearing female in no acute distress  RESPIRATORY: Lungs clear to auscultation, respirations unlabored  CV: Regular rate and rhythm, S1S2, no clicks, murmurs, rubs, or gallops; BLE without edema  GASTROINTESTINAL: Abdomen soft, non-distended, and non-tender to palpation; normoactive BS  NEUROLOGIC: No gross deficits  PSYCHIATRIC: Pleasant and interactive, affect euthymic, makes appropriate eye contact, thought process logical       ASSESSMENT/PLAN:                                                          Dione was seen today for recheck.    Diagnoses and all orders for this visit:    Overweight (BMI 25.0-29.9)  -     Basic Metabolic Panel (8) ([facility])  -     Tirzepatide 2.5 MG/0.5ML SOPN; Inject 2.5 mg Subcutaneous every 7 days    Tolerating this generally well- declines increasing dose which is reasonable given that she is still having some nausea and had significant GI distress with higher doses of GLP1 in the past. Exercising regularly and has changed the diet, sugar cravings gone. Weight is down 6lb from last visit. Continue current plan with tirzepatide 2.5mg weekly along with healthy diet and exercise, and if all is tolerated well, recheck in three months. Reviewed side effects of medications, alarm signs and symptoms, and when to seek further care.    Nausea  -     ondansetron (ZOFRAN) 4 MG tablet; Take 1 tablet (4 mg) by mouth every 6 hours as needed for nausea    Trial of scheduled ondansetron just before her symptoms tend to start. Reviewed side effects of medications, alarm signs and symptoms, and when to seek further care.    Dyslipidemia  -     Lipid Panel ([facility])  -     Tirzepatide 2.5 MG/0.5ML SOPN; Inject 2.5 mg Subcutaneous every 7 days    Rechecking given weight loss    Genitourinary syndrome of menopause  -     estradiol (ESTRACE) 0.1 MG/GM vaginal cream; Place 2 g vaginally twice a week    Discussed non-pharmacologic and  pharmacologic interventions- would like to try estradiol cream. Reviewed side effects of medications, alarm signs and symptoms, and when to seek further care.    Colon cancer screening  -     COLOGUARD([employer])    After discussion of different screening modalities for colorectal cancer, elects for Cologuard.    Need for influenza vaccination  -     INFLUENZA,INJ,MDCK,PF,QUAD >6MO([facility])    Counseled          Patient Instructions   Starting estradiol- twice weekly at bedtime, let us know if it is cost prohibitive or doesn't work- watch for vaginal bleeding and let us know.    Luvena, Sutil, Hyalo-Gyn are all good over the counter and non-hormonal options available online.    Trial of ondansetron Wednesday evenings, then overnight if you wake up nauseated, then Thursday mornings    Mounjaro continued at 2.5mg weekly, but let us know if tolerance changes      SHAINA Arroyo CNP  [facility]  Family Practice  [facility]  [phone]    For any issues my office # is [phone]

## 2022-09-20 NOTE — PROGRESS NOTES
Order(s) created erroneously. Erroneous order ID: [id number]   Order canceled by: JAY HERNANDEZ   Order cancel date/time: 09/20/2022 2:41 PM

## 2022-09-20 NOTE — PROGRESS NOTES
Order(s) created erroneously. Erroneous order ID: [id number]   Order canceled by: JAY HERNANDEZ   Order cancel date/time: 09/20/2022 2:41 PM

## 2022-09-20 NOTE — NURSING NOTE
Due to mislabeled tubes- not discovered until later  Called patient to return to clinic for redraw  Patient agrees to come back next week after work travel- fasting  Christina Santos MA September 20, 2022 8:01 AM

## 2022-10-10 ENCOUNTER — TRANSFERRED RECORDS (OUTPATIENT)
Dept: FAMILY MEDICINE | Facility: CLINIC | Age: 47
End: 2022-10-10

## 2022-10-12 DIAGNOSIS — E78.5 DYSLIPIDEMIA: Primary | ICD-10-CM

## 2022-10-12 DIAGNOSIS — E66.3 OVERWEIGHT (BMI 25.0-29.9): ICD-10-CM

## 2022-10-12 LAB
CHOL/HDL RATIO (RMG): 3.4 MG/DL (ref 0–4.5)
CHOLESTEROL: 225 MG/DL (ref 100–199)
HDL (RMG): 66 MG/DL (ref 40–?)
LDL CALCULATED (RMG): 144 MG/DL (ref 0–130)
TRIGLYCERIDES (RMG): 75 MG/DL (ref 0–149)

## 2022-10-12 PROCEDURE — 80061 LIPID PANEL: CPT | Mod: QW | Performed by: NURSE PRACTITIONER

## 2022-10-12 PROCEDURE — 36415 COLL VENOUS BLD VENIPUNCTURE: CPT | Performed by: NURSE PRACTITIONER

## 2022-10-13 LAB
BUN SERPL-MCNC: 11 MG/DL (ref 6–24)
BUN/CREATININE RATIO: 14 (ref 9–23)
CALCIUM SERPL-MCNC: 9.1 MG/DL (ref 8.7–10.2)
CHLORIDE SERPLBLD-SCNC: 104 MMOL/L (ref 96–106)
CREAT SERPL-MCNC: 0.8 MG/DL (ref 0.57–1)
EGFR: 90 ML/MIN/1.73
GLUCOSE SERPL-MCNC: 73 MG/DL (ref 70–99)
POTASSIUM SERPL-SCNC: 3.9 MMOL/L (ref 3.5–5.2)
SODIUM SERPL-SCNC: 144 MMOL/L (ref 134–144)
TOTAL CO2: 26 MMOL/L (ref 20–29)

## 2022-10-16 LAB — NONINV COLON CA DNA+OCC BLD SCRN STL QL: NEGATIVE

## 2022-10-27 DIAGNOSIS — E66.3 OVERWEIGHT (BMI 25.0-29.9): ICD-10-CM

## 2022-10-27 DIAGNOSIS — E78.5 DYSLIPIDEMIA: ICD-10-CM

## 2022-11-28 ENCOUNTER — MYC MEDICAL ADVICE (OUTPATIENT)
Dept: FAMILY MEDICINE | Facility: CLINIC | Age: 47
End: 2022-11-28

## 2022-11-28 DIAGNOSIS — E66.3 OVERWEIGHT (BMI 25.0-29.9): ICD-10-CM

## 2022-11-28 DIAGNOSIS — E78.5 DYSLIPIDEMIA: ICD-10-CM

## 2022-11-30 NOTE — TELEPHONE ENCOUNTER
Message sent to patient via [employer].     SHAINA Valdez CNP on 11/30/2022 at 7:59 AM

## 2023-01-04 ENCOUNTER — OFFICE VISIT (OUTPATIENT)
Dept: FAMILY MEDICINE | Facility: CLINIC | Age: 48
End: 2023-01-04

## 2023-01-04 VITALS
WEIGHT: 141.2 LBS | OXYGEN SATURATION: 98 % | HEART RATE: 74 BPM | DIASTOLIC BLOOD PRESSURE: 83 MMHG | TEMPERATURE: 97.5 F | BODY MASS INDEX: 25.98 KG/M2 | SYSTOLIC BLOOD PRESSURE: 112 MMHG | HEIGHT: 62 IN | RESPIRATION RATE: 16 BRPM

## 2023-01-04 DIAGNOSIS — E78.5 DYSLIPIDEMIA: ICD-10-CM

## 2023-01-04 DIAGNOSIS — E66.3 OVERWEIGHT (BMI 25.0-29.9): Primary | ICD-10-CM

## 2023-01-04 DIAGNOSIS — B00.1 COLD SORE: ICD-10-CM

## 2023-01-04 PROCEDURE — 99214 OFFICE O/P EST MOD 30 MIN: CPT | Performed by: NURSE PRACTITIONER

## 2023-01-04 RX ORDER — VALACYCLOVIR HYDROCHLORIDE 1 G/1
TABLET, FILM COATED ORAL
Qty: 20 TABLET | Refills: 1 | Status: SHIPPED | OUTPATIENT
Start: 2023-01-04

## 2023-01-04 NOTE — PATIENT INSTRUCTIONS
Valtrex as needed for cold sores - dosing would be 2 tablets taken in the morning & 2 tablets in evening for 1 day. I sent enough for 5 rounds of this.    Staying with 2.5 mg tirzepatide. Try decreasing intake of sugar to see if that helps with cravings. Send message or call if wanting to go up to 5 mg dose.

## 2023-01-04 NOTE — PROGRESS NOTES
"Answers for HPI/ROS submitted by the patient on 1/4/2023  What is the reason for your visit today? : renewal of medication  How many servings of fruits and vegetables do you eat daily?: 2-3  On average, how many sweetened beverages do you drink each day (Examples: soda, juice, sweet tea, etc.  Do NOT count diet or artificially sweetened beverages)?: 0  How many minutes a day do you exercise enough to make your heart beat faster?: 20 to 29  How many days a week do you exercise enough to make your heart beat faster?: 3 or less  How many days per week do you miss taking your medication?: 0    Problem(s) Oriented visit        SUBJECTIVE:                                                    Dione Villa is a 48 year old female who presents to clinic today for the following health issues :    Tripeptide 2.5 mg weekly to aid in weight loss. Used Wegovy before this. Tolerating well and hasn't had to take ondansetron in past few weeks. Almost 30 lb weight loss.   Wt Readings from Last 10 Encounters:   01/04/23 64 kg (141 lb 3.2 oz)   09/19/22 65.7 kg (144 lb 12.8 oz)   08/17/22 68 kg (150 lb)   07/11/22 66.8 kg (147 lb 6 oz)   01/27/22 67.2 kg (148 lb 3.2 oz)   11/29/21 74.4 kg (164 lb)   08/10/21 77.1 kg (170 lb)   10/18/18 76.6 kg (168 lb 12.8 oz)   08/04/16 73.5 kg (162 lb)   05/29/15 68.9 kg (152 lb)     Occasional cold sores and Valtrex in the past worked well.    Problem list, Medication list, Allergies, and Medical/Social/Surgical histories reviewed in [facility] and updated as appropriate.   Additional history: as documented    ROS:  5 point ROS completed and negative except noted above, including Gen, CV, Resp, GI, Skin    OBJECTIVE:                                                    /83   Pulse 74   Temp 97.5  F (36.4  C) (Temporal)   Resp 16   Ht 1.562 m (5' 1.5\")   Wt 64 kg (141 lb 3.2 oz)   SpO2 98%   BMI 26.25 kg/m    Body mass index is 26.25 kg/m .   GENERAL: healthy, alert and no distress  PSYCH: " "mentation appears normal, affect normal/bright     ASSESSMENT/PLAN:                                                      BMI:   Estimated body mass index is 26.25 kg/m  as calculated from the following:    Height as of this encounter: 1.562 m (5' 1.5\").    Weight as of this encounter: 64 kg (141 lb 3.2 oz).   Weight management plan: Discussed healthy diet and exercise guidelines Tirzepatide injection      Dione was seen today for recheck medication.    Diagnoses and all orders for this visit:    Overweight (BMI 25.0-29.9)  -     Tirzepatide 2.5 MG/0.5ML SOPN; Inject 2.5 mg Subcutaneous every 7 days  Dyslipidemia  -     Tirzepatide 2.5 MG/0.5ML SOPN; Inject 2.5 mg Subcutaneous every 7 days  Patient having successful almost 30 lb weight loss with use of medication and lifestyle changes. Discussed continuing current dose or increasing but plan to stay where it is for now.    Cold sore  -     valACYclovir (VALTREX) 1000 mg tablet; Take 2 tablets (2000 mg) twice daily for one day as needed for cold sores  Uses prn.      See Patient Instructions  Patient Instructions   Valtrex as needed for cold sores - dosing would be 2 tablets taken in the morning & 2 tablets in evening for 1 day. I sent enough for 5 rounds of this.    Staying with 2.5 mg tirzepatide. Try decreasing intake of sugar to see if that helps with cravings. Send message or call if wanting to go up to 5 mg dose.      SHAINA Valdez CNP  [facility]  Family Practice  [facility]  [phone]    For any issues my office # is [phone]      "

## 2023-01-24 ENCOUNTER — MYC MEDICAL ADVICE (OUTPATIENT)
Dept: FAMILY MEDICINE | Facility: CLINIC | Age: 48
End: 2023-01-24

## 2023-01-24 DIAGNOSIS — E66.3 OVERWEIGHT (BMI 25.0-29.9): Primary | ICD-10-CM

## 2023-01-24 NOTE — TELEPHONE ENCOUNTER
Patient requesting increase in dose of Mounjaro to 5mg/week.   Last related visit: 1/4/23 with Beti Chatterjee CNP. Dose increase was discussed but patient was not ready due to adjusting to s/e of 2.5mg/week.   Plan: routed to Beti Chatterjee CNP for review.  Dalia Grider RN

## 2023-01-25 NOTE — TELEPHONE ENCOUNTER
New dose sent to pharmacy. Message sent to patient via [employer].     SHAINA Valdez CNP on 1/25/2023 at 9:28 AM

## 2023-03-26 ENCOUNTER — HEALTH MAINTENANCE LETTER (OUTPATIENT)
Age: 48
End: 2023-03-26

## 2023-04-18 DIAGNOSIS — E66.3 OVERWEIGHT (BMI 25.0-29.9): ICD-10-CM

## 2023-04-18 NOTE — TELEPHONE ENCOUNTER
mounjaro  LOV 1/4/23  Wt Readings from Last 4 Encounters:   01/04/23 64 kg (141 lb 3.2 oz)   09/19/22 65.7 kg (144 lb 12.8 oz)   08/17/22 68 kg (150 lb)   07/11/22 66.8 kg (147 lb 6 oz)

## 2023-06-26 DIAGNOSIS — E66.3 OVERWEIGHT (BMI 25.0-29.9): ICD-10-CM

## 2023-06-26 NOTE — TELEPHONE ENCOUNTER
tirzepatide (Mounjaro) 5 mg/0.5 ml    LOV 1/4/23    Last labs 10/12/22    No appt - due- needs new PCP    Called patient today to set up appt    Patient agrees- new PCP Lashaun Longoria

## 2023-07-14 ENCOUNTER — OFFICE VISIT (OUTPATIENT)
Dept: FAMILY MEDICINE | Facility: CLINIC | Age: 48
End: 2023-07-14

## 2023-07-14 VITALS
WEIGHT: 131.4 LBS | BODY MASS INDEX: 24.43 KG/M2 | DIASTOLIC BLOOD PRESSURE: 68 MMHG | OXYGEN SATURATION: 99 % | SYSTOLIC BLOOD PRESSURE: 111 MMHG | HEART RATE: 89 BPM

## 2023-07-14 DIAGNOSIS — Z86.39 HISTORY OF OBESITY IN ADULTHOOD: Primary | ICD-10-CM

## 2023-07-14 DIAGNOSIS — N94.10 DYSPAREUNIA, FEMALE: ICD-10-CM

## 2023-07-14 DIAGNOSIS — N95.8 GENITOURINARY SYNDROME OF MENOPAUSE: ICD-10-CM

## 2023-07-14 DIAGNOSIS — M54.18 RADICULAR PAIN OF SACRUM: ICD-10-CM

## 2023-07-14 PROCEDURE — 99213 OFFICE O/P EST LOW 20 MIN: CPT

## 2023-07-14 RX ORDER — NAPROXEN SODIUM 275 MG/1
275 TABLET ORAL 2 TIMES DAILY WITH MEALS
Qty: 60 TABLET | Refills: 0 | Status: SHIPPED | OUTPATIENT
Start: 2023-07-14 | End: 2023-08-13

## 2023-07-14 NOTE — PATIENT INSTRUCTIONS
Vaginal moisturizers are intended for use routinely, typically two or three days per week, not just during sexual activity. These products are typically bioadhesives. Hyaluronic acid is often used as a key ingredient in vaginal moisturizers. Many moisturizer products are available in pharmacies and online (eg, Replens, Vagisil Moisturizer, Feminease, Moist Again, K-Y Liquibeads, Hyalo GYN, Revaree suppositories)

## 2023-07-14 NOTE — PROGRESS NOTES
"  Assessment & Plan     History of obesity in adulthood  -continue on Mounjaro  -did body composition analysis today - needs to increase muscle mass - weight lifting exercise emphasized  -nutrition, medication, exercise, and mental relationship with food emphasized as the 4 requirements to have good adherence to weight management   -Patient verbalized understanding and is agreeable to the discussed plan of care.      Dyspareunia, female  -patient could also trial vaginal cream 2-3 times per week ( see patient instructions)  -continue uberlube  -ordered estring to see if this helps with lubrication and is less of a messy process for patient   -advised trying to seek sexual advice from [employer] [facility] in [place], [place]  - estradiol (ESTRING) 7.5 MCG/24HR vaginal ring  Dispense: 1 each; Refill: 0    Genitourinary syndrome of menopause  -see above    Radicular sacral pain to right side  -naproxen for pain  -continue stretches and practicing good ergonomics at the office       Prescription drug management         BMI:   Estimated body mass index is 24.43 kg/m  as calculated from the following:    Height as of 1/4/23: 1.562 m (5' 1.5\").    Weight as of this encounter: 59.6 kg (131 lb 6.4 oz).     She has never had children - has adopted children  LMP: ' it has been years since having period'  Hx of endometriosis   Using 2g estradiol cream vaginally one time per week but stopped 2 months ago  - reports it being messy and not see much improvement  UberLube helps       Patient also reports right side sacral pain with mild radiculopathy - worse when patient has been sitting for 1 hour - has 1 hour commute for work      MEDICATIONS:  Continue current medications without change  Regular exercise  See Patient Instructions    No follow-ups on file.    SHAINA Rendon CNP  [facility]    Richie Parham is a 48 year old, presenting for the following health issues:  Recheck Medication " (Tirzepatide. )    HPI     Medication Followup of tirzepatide (Mounjaro)    Taking Medication as prescribed: yes    Side Effects:  None, tolerating 5mg well    Medication Helping Symptoms:  yes          Review of Systems   CONSTITUTIONAL: NEGATIVE for fever, chills, change in weight  ENT/MOUTH: NEGATIVE for ear, mouth and throat problems  RESP: NEGATIVE for significant cough or SOB  CV: NEGATIVE for chest pain, palpitations or peripheral edema  GI: NEGATIVE for nausea, abdominal pain, heartburn, or change in bowel habits  ROS otherwise negative      Objective    /68   Pulse 89   Wt 59.6 kg (131 lb 6.4 oz)   SpO2 99%   BMI 24.43 kg/m    Body mass index is 24.43 kg/m .  Physical Exam   GENERAL: healthy, alert and no distress  EYES: Eyes grossly normal to inspection, PERRL and conjunctivae and sclerae normal  NECK: no adenopathy, no asymmetry, masses, or scars and thyroid normal to palpation  RESP: lungs clear to auscultation - no rales, rhonchi or wheezes  CV: regular rate and rhythm, normal S1 S2, no S3 or S4, no murmur, click or rub, no peripheral edema and peripheral pulses strong  ABDOMEN: soft, nontender, no hepatosplenomegaly, no masses and bowel sounds normal  MS: no gross musculoskeletal defects noted, no edema  SKIN: no suspicious lesions or rashes  NEURO: Normal strength and tone, mentation intact and speech normal  BACK: no CVA tenderness, no paralumbar tenderness  PSYCH: mentation appears normal, affect normal/bright  LYMPH: no cervical, supraclavicular, axillary, or inguinal adenopathy

## 2023-07-27 ENCOUNTER — TELEPHONE (OUTPATIENT)
Dept: FAMILY MEDICINE | Facility: CLINIC | Age: 48
End: 2023-07-27

## 2023-07-27 DIAGNOSIS — N94.10 DYSPAREUNIA, FEMALE: Primary | ICD-10-CM

## 2023-07-27 RX ORDER — ESTRADIOL 10 UG/1
10 INSERT VAGINAL
Qty: 24 TABLET | Refills: 3 | Status: SHIPPED | OUTPATIENT
Start: 2023-07-27 | End: 2024-09-18

## 2023-07-27 NOTE — TELEPHONE ENCOUNTER
----- Message from SHAINA Rendon CNP sent at 7/26/2023  7:30 AM CDT -----  I am a hot mess express    Estradiol 10mcg tab, insert daily for the first 2 weeks then 2 times per week thereafter. If 2 times per week is not fully covering it then she can go up to three times per week.     Thank you!      ----- Message -----  From: Sofía Eid LPN  Sent: 7/25/2023   4:11 PM CDT  To: SHAINA Rendon CNP    Dosage and directions?  ----- Message -----  From: Lashaun Longoria APRN CNP  Sent: 7/24/2023   8:00 PM CDT  To: Sofía Eid LPN    She has already tried the cream so we can try the tab.    Thank you!  ----- Message -----  From: Sofía Eid LPN  Sent: 7/24/2023   5:42 PM CDT  To: SHAINA Rendon CNP    We did a prior authorization for Estring 7.5mcg vag ring and it was denied. She will need to try at least 2 preferred alternatives, premarin cream, estradiol 0.01% cream or estradiol tab.  Thoughts?

## 2023-07-27 NOTE — TELEPHONE ENCOUNTER
Called and discussed medication change per Lashaun Longoria CNP with patient. Patient agrees, prescription sent to pharmacy. Patient advised to call clinic with questions or concerns. Sofía Monge

## 2023-11-08 ENCOUNTER — MYC REFILL (OUTPATIENT)
Dept: FAMILY MEDICINE | Facility: CLINIC | Age: 48
End: 2023-11-08

## 2023-11-08 DIAGNOSIS — E66.3 OVERWEIGHT (BMI 25.0-29.9): ICD-10-CM

## 2023-11-08 DIAGNOSIS — E78.5 DYSLIPIDEMIA: ICD-10-CM

## 2023-11-17 NOTE — TELEPHONE ENCOUNTER
Prior authorization done via CoverMyMeds for Mounjaro, prior authorization was denied as patient does not have DM2. Patient notified via [employer]. Sofía Monge

## 2023-11-27 ENCOUNTER — TRANSFERRED RECORDS (OUTPATIENT)
Dept: FAMILY MEDICINE | Facility: CLINIC | Age: 48
End: 2023-11-27

## 2023-12-19 ENCOUNTER — MYC MEDICAL ADVICE (OUTPATIENT)
Dept: FAMILY MEDICINE | Facility: CLINIC | Age: 48
End: 2023-12-19

## 2024-06-01 ENCOUNTER — HEALTH MAINTENANCE LETTER (OUTPATIENT)
Age: 49
End: 2024-06-01

## 2024-09-18 ENCOUNTER — OFFICE VISIT (OUTPATIENT)
Dept: FAMILY MEDICINE | Facility: CLINIC | Age: 49
End: 2024-09-18

## 2024-09-18 VITALS
SYSTOLIC BLOOD PRESSURE: 121 MMHG | BODY MASS INDEX: 30.3 KG/M2 | OXYGEN SATURATION: 95 % | DIASTOLIC BLOOD PRESSURE: 73 MMHG | WEIGHT: 163 LBS | HEART RATE: 73 BPM

## 2024-09-18 DIAGNOSIS — E66.811 CLASS 1 OBESITY DUE TO EXCESS CALORIES WITHOUT SERIOUS COMORBIDITY WITH BODY MASS INDEX (BMI) OF 30.0 TO 30.9 IN ADULT: ICD-10-CM

## 2024-09-18 DIAGNOSIS — R73.03 PREDIABETES: ICD-10-CM

## 2024-09-18 DIAGNOSIS — N94.10 DYSPAREUNIA, FEMALE: ICD-10-CM

## 2024-09-18 DIAGNOSIS — E66.09 CLASS 1 OBESITY DUE TO EXCESS CALORIES WITHOUT SERIOUS COMORBIDITY WITH BODY MASS INDEX (BMI) OF 30.0 TO 30.9 IN ADULT: Primary | ICD-10-CM

## 2024-09-18 DIAGNOSIS — E78.2 MIXED HYPERLIPIDEMIA: ICD-10-CM

## 2024-09-18 DIAGNOSIS — M54.16 LUMBAR BACK PAIN WITH RADICULOPATHY AFFECTING LEFT LOWER EXTREMITY: ICD-10-CM

## 2024-09-18 DIAGNOSIS — E66.09 CLASS 1 OBESITY DUE TO EXCESS CALORIES WITHOUT SERIOUS COMORBIDITY WITH BODY MASS INDEX (BMI) OF 30.0 TO 30.9 IN ADULT: ICD-10-CM

## 2024-09-18 DIAGNOSIS — E66.811 CLASS 1 OBESITY DUE TO EXCESS CALORIES WITHOUT SERIOUS COMORBIDITY WITH BODY MASS INDEX (BMI) OF 30.0 TO 30.9 IN ADULT: Primary | ICD-10-CM

## 2024-09-18 LAB
ANION GAP SERPL CALCULATED.3IONS-SCNC: 12 MMOL/L (ref 7–15)
BUN SERPL-MCNC: 11.1 MG/DL (ref 6–20)
CALCIUM SERPL-MCNC: 9.9 MG/DL (ref 8.8–10.4)
CHLORIDE SERPL-SCNC: 101 MMOL/L (ref 98–107)
CREAT SERPL-MCNC: 0.79 MG/DL (ref 0.51–0.95)
EGFRCR SERPLBLD CKD-EPI 2021: 90 ML/MIN/1.73M2
EST. AVERAGE GLUCOSE BLD GHB EST-MCNC: 114 MG/DL
FASTING STATUS PATIENT QL REPORTED: NO
GLUCOSE SERPL-MCNC: 99 MG/DL (ref 70–99)
HBA1C MFR BLD: 5.6 %
HCO3 SERPL-SCNC: 27 MMOL/L (ref 22–29)
POTASSIUM SERPL-SCNC: 4.6 MMOL/L (ref 3.4–5.3)
SODIUM SERPL-SCNC: 140 MMOL/L (ref 135–145)

## 2024-09-18 PROCEDURE — 99214 OFFICE O/P EST MOD 30 MIN: CPT

## 2024-09-18 PROCEDURE — G2211 COMPLEX E/M VISIT ADD ON: HCPCS

## 2024-09-18 PROCEDURE — 80048 BASIC METABOLIC PNL TOTAL CA: CPT | Mod: 90

## 2024-09-18 PROCEDURE — 80048 BASIC METABOLIC PNL TOTAL CA: CPT

## 2024-09-18 PROCEDURE — 83036 HEMOGLOBIN GLYCOSYLATED A1C: CPT

## 2024-09-18 PROCEDURE — 36415 COLL VENOUS BLD VENIPUNCTURE: CPT

## 2024-09-18 PROCEDURE — 83695 ASSAY OF LIPOPROTEIN(A): CPT

## 2024-09-18 PROCEDURE — 83695 ASSAY OF LIPOPROTEIN(A): CPT | Mod: 90

## 2024-09-18 RX ORDER — ESTRADIOL 10 UG/1
10 INSERT VAGINAL
Qty: 24 TABLET | Refills: 3 | Status: SHIPPED | OUTPATIENT
Start: 2024-09-19

## 2024-09-18 NOTE — PATIENT INSTRUCTIONS
Lashaun the Dietician - Metabolic Metabolism       [email or web address]    Www.usda.gov      [employer] - book resource

## 2024-09-19 LAB — APO A-I SERPL-MCNC: 117 MG/DL

## 2024-09-19 NOTE — TELEPHONE ENCOUNTER
Med: Wegovy 0.25 mg       LOV (related): 9/18/24      Due for F/U around: 3 weeks     Next Appt: No current future appointments scheduled at [facility]         Wt Readings from Last 2 Encounters:   09/18/24 73.9 kg (163 lb)   07/14/23 59.6 kg (131 lb 6.4 oz)       BMI Readings from Last 2 Encounters:   09/18/24 30.30 kg/m    07/14/23 24.43 kg/m

## 2024-09-20 RX ORDER — SEMAGLUTIDE 0.25 MG/.5ML
INJECTION, SOLUTION SUBCUTANEOUS
Qty: 3 ML | Refills: 0 | Status: SHIPPED | OUTPATIENT
Start: 2024-09-20

## 2024-10-28 ENCOUNTER — OFFICE VISIT (OUTPATIENT)
Dept: FAMILY MEDICINE | Facility: CLINIC | Age: 49
End: 2024-10-28

## 2024-10-28 VITALS
OXYGEN SATURATION: 100 % | HEART RATE: 68 BPM | SYSTOLIC BLOOD PRESSURE: 99 MMHG | BODY MASS INDEX: 30.49 KG/M2 | DIASTOLIC BLOOD PRESSURE: 75 MMHG | WEIGHT: 164 LBS

## 2024-10-28 DIAGNOSIS — Z23 IMMUNIZATION DUE: ICD-10-CM

## 2024-10-28 DIAGNOSIS — K59.03 DRUG-INDUCED CONSTIPATION: ICD-10-CM

## 2024-10-28 DIAGNOSIS — E78.41 ELEVATED LIPOPROTEIN(A): ICD-10-CM

## 2024-10-28 DIAGNOSIS — E66.811 CLASS 1 OBESITY DUE TO EXCESS CALORIES WITHOUT SERIOUS COMORBIDITY WITH BODY MASS INDEX (BMI) OF 30.0 TO 30.9 IN ADULT: Primary | ICD-10-CM

## 2024-10-28 DIAGNOSIS — E78.00 HYPERCHOLESTEROLEMIA: ICD-10-CM

## 2024-10-28 DIAGNOSIS — E66.09 CLASS 1 OBESITY DUE TO EXCESS CALORIES WITHOUT SERIOUS COMORBIDITY WITH BODY MASS INDEX (BMI) OF 30.0 TO 30.9 IN ADULT: Primary | ICD-10-CM

## 2024-10-28 PROBLEM — E66.3 OVERWEIGHT (BMI 25.0-29.9): Status: RESOLVED | Noted: 2022-01-27 | Resolved: 2024-10-28

## 2024-10-28 LAB
CHOLESTEROL: 247 MG/DL (ref 100–199)
FASTING?: YES
HDL (RMG): 56 MG/DL (ref 40–?)
LDL CALCULATED (RMG): 164 MG/DL (ref 0–130)
TRIGLYCERIDES (RMG): 133 MG/DL (ref 0–149)

## 2024-10-28 PROCEDURE — 99214 OFFICE O/P EST MOD 30 MIN: CPT | Mod: 25

## 2024-10-28 PROCEDURE — 80061 LIPID PANEL: CPT | Mod: QW

## 2024-10-28 PROCEDURE — 36415 COLL VENOUS BLD VENIPUNCTURE: CPT

## 2024-10-28 PROCEDURE — 90661 CCIIV3 VAC ABX FR 0.5 ML IM: CPT

## 2024-10-28 PROCEDURE — 90471 IMMUNIZATION ADMIN: CPT

## 2024-10-28 RX ORDER — POLYETHYLENE GLYCOL 3350 17 G/17G
1 POWDER, FOR SOLUTION ORAL DAILY
COMMUNITY
Start: 2024-10-28

## 2024-10-28 NOTE — PROGRESS NOTES
Assessment & Plan     Class 1 obesity due to excess calories without serious comorbidity with body mass index (BMI) of 30.0 to 30.9 in adult  - doing well on wegovy, feels not as effective at weight loss compared to mounjaro but does not qualify for this medication any more. Will increase dose and can let me know in 2-3 weeks if tolerating ok and we can increase dose again with follow up next month  - Education about adverse effects of prescription medication discussed  -Red flags that warrant emergent evaluation discussed  -Patient verbalized understanding and is agreeable to the discussed plan of care.    - Semaglutide-Weight Management (WEGOVY) 0.5 MG/0.5ML pen  Dispense: 2 mL; Refill: 0  - Lipid Profile (RMG)    Immunization due  - VACCINE ADMINISTRATION, INITIAL    Drug-induced constipation  - given travel coming up and worse constipation baseline recommend using miralax PRN  - polyethylene glycol (MIRALAX) 17 g packet    Hypercholesterolemia  - hesitant about starting statin, discussed cholesterol at length including lipoprotein a and what this indicates as well, I would recommend rosuvastatin 20mg daily, she will think about this and probably do a calcium scan as well   - CT Coronary Artery Angio w Calcium Score  - Lipid Profile (RMG)  - VENOUS COLLECTION    Elevated lipoprotein(a)  - see plan above   - CT Coronary Artery Angio w Calcium Score  - Lipid Profile (RMG)              See Patient Instructions    No follow-ups on file.    Richie Parham is a 49 year old, presenting for the following health issues:  MWM (Check in on wegovy, feels that things are going well. Some appetite suppression, but not as much as mounjaro./Denies side effects) and Imm/Inj (Flu shot today! Deferring covid and zoster)    HPI     1.) MWM: started on wegovy 4 weeks ago. Had 2 headaches since starting on wegovy but otherwise tolerating ok.  Denies nausea/vomiting.  Injection day is Wednesday and got headaches on Thursday.   "Increasing protein intake.  Everyday goal is \"no processed foods\" and not eating out or fast food.      2.) lower back pain: doing better with physical therapy     Wt Readings from Last 4 Encounters:   10/28/24 74.4 kg (164 lb)   09/18/24 73.9 kg (163 lb)   07/14/23 59.6 kg (131 lb 6.4 oz)   01/04/23 64 kg (141 lb 3.2 oz)          Review of Systems  Constitutional, HEENT, cardiovascular, pulmonary, gi and gu systems are negative, except as otherwise noted.      Objective    BP 99/75   Pulse 68   Wt 74.4 kg (164 lb)   SpO2 100%   BMI 30.49 kg/m    Body mass index is 30.49 kg/m .  Physical Exam   GENERAL: alert and no distress  EYES: Eyes grossly normal to inspection, PERRL and conjunctivae and sclerae normal  RESP: unlabored breathing, equal chest rise   PSYCH: mentation appears normal, affect normal/bright    Results for orders placed or performed in visit on 10/28/24 (from the past 24 hours)   Lipid Profile (RMG)   Result Value Ref Range    Cholesterol 247 (A) 100 - 199 mg/dL    HDL 56 >=40 mg/dL    Triglycerides 133 0 - 149 mg/dL    LDL CALCULATED (RMG) 164 (A) 0 - 130 mg/dL    Patient Fasting? Yes            Signed Electronically by: SHAINA Rendon CNP    The longitudinal plan of care for the diagnosis(es)/condition(s) as documented were addressed during this visit. Due to the added complexity in care, I will continue to support Dione in the subsequent management and with ongoing continuity of care.  "

## 2024-10-28 NOTE — NURSING NOTE
1.  Has the patient received the information for the influenza vaccine? YES verba;    2.  Does the patient have any of the following contraindications?       Allergic reaction to previous influenza vaccines? No     Any other problems to previous influenza vaccines? No     Paralyzed by Guillain-Canalou syndrome? No     Currently pregnant? NO     Current moderate or severe illness? No     Have you had a bone marrow transplant in past 6 months? No      Vaccination given by Christina Santos MA October 28, 2024 1:34 PM  .  Recorded by Christina Santos MA

## 2024-11-25 ENCOUNTER — OFFICE VISIT (OUTPATIENT)
Dept: FAMILY MEDICINE | Facility: CLINIC | Age: 49
End: 2024-11-25

## 2024-11-25 VITALS
DIASTOLIC BLOOD PRESSURE: 83 MMHG | HEART RATE: 69 BPM | OXYGEN SATURATION: 99 % | WEIGHT: 160 LBS | SYSTOLIC BLOOD PRESSURE: 127 MMHG | BODY MASS INDEX: 30.21 KG/M2 | HEIGHT: 61 IN

## 2024-11-25 DIAGNOSIS — B00.1 COLD SORE: ICD-10-CM

## 2024-11-25 DIAGNOSIS — E66.811 CLASS 1 OBESITY DUE TO EXCESS CALORIES WITHOUT SERIOUS COMORBIDITY WITH BODY MASS INDEX (BMI) OF 30.0 TO 30.9 IN ADULT: Primary | ICD-10-CM

## 2024-11-25 DIAGNOSIS — R11.0 NAUSEA: ICD-10-CM

## 2024-11-25 DIAGNOSIS — E66.09 CLASS 1 OBESITY DUE TO EXCESS CALORIES WITHOUT SERIOUS COMORBIDITY WITH BODY MASS INDEX (BMI) OF 30.0 TO 30.9 IN ADULT: Primary | ICD-10-CM

## 2024-11-25 RX ORDER — VALACYCLOVIR HYDROCHLORIDE 1 G/1
TABLET, FILM COATED ORAL
Qty: 20 TABLET | Refills: 1 | Status: SHIPPED | OUTPATIENT
Start: 2024-11-25

## 2024-11-25 RX ORDER — ONDANSETRON 4 MG/1
4-8 TABLET, ORALLY DISINTEGRATING ORAL EVERY 8 HOURS PRN
Qty: 20 TABLET | Refills: 0 | Status: SHIPPED | OUTPATIENT
Start: 2024-11-25

## 2024-11-25 NOTE — PROGRESS NOTES
"  Assessment & Plan     Class 1 obesity due to excess calories without serious comorbidity with body mass index (BMI) of 30.0 to 30.9 in adult  - continue at current dose of wegovy, patient to call when she feels ready to increase dose with recheck after dose increase in 2-3 weeks  - continue working on incorporating more exercise into daily regimen, doing well with diet   - Semaglutide-Weight Management (WEGOVY) 0.5 MG/0.5ML pen  Dispense: 6 mL; Refill: 0    Cold sore  -refill provided   - valACYclovir (VALTREX) 1000 mg tablet  Dispense: 20 tablet; Refill: 1    Nausea  - intermittent nausea that is improving over time with dose increase of wegovy, zofran to be used PRN   -Education about adverse effects of prescription medication discussed  -Red flags that warrant emergent evaluation discussed  -Patient verbalized understanding and is agreeable to the discussed plan of care.    - ondansetron (ZOFRAN ODT) 4 MG ODT tab  Dispense: 20 tablet; Refill: 0            BMI  Estimated body mass index is 29.99 kg/m  as calculated from the following:    Height as of this encounter: 1.556 m (5' 1.25\").    Weight as of this encounter: 72.6 kg (160 lb).   Weight management plan: Discussed healthy diet and exercise guidelines      See Patient Instructions    No follow-ups on file.    Richie Parham is a 49 year old, presenting for the following health issues:  MWM (Follow up on weight management. Currently on wegovy 0.5mg weekly, going well. Has had minor headaches and nausea. Wondering if she can get some zofran /Feels that weight  isn't dropping as fast as she would like.) and Mouth Lesions (Cold sore flare, needing refill of valtrex)    HPI     1.) Obesity: taking 0.5mg wegovy weekly. She has been on the 0.5mg dose since 10/28/2024.  Reducing her food noise in her head which she really likes.  Reports intermittent nausea and headaches. Walking is her form of exercise - at least 2 miles.  Doing resistant training for " "physical therapy.   Egg and fruit - breakfast  Premixed salad from [employer] - lunch  Egg + cottage cheese for protein based bread with meat    Travelling more over this past month - has to order more of fast food or restaurant food      Wt Readings from Last 4 Encounters:   11/25/24 72.6 kg (160 lb)   10/28/24 74.4 kg (164 lb)   09/18/24 73.9 kg (163 lb)   07/14/23 59.6 kg (131 lb 6.4 oz)     2.) history of cold sores needing valcyclovir refilled.     Pre-ovarian failure at 26 years with endometriosis diagnosis and 3 surgeries for adhesions.        Review of Systems  Constitutional, HEENT, cardiovascular, pulmonary, gi and gu systems are negative, except as otherwise noted.      Objective    /83   Pulse 69   Ht 1.556 m (5' 1.25\")   Wt 72.6 kg (160 lb)   SpO2 99%   BMI 29.99 kg/m    Body mass index is 29.99 kg/m .  Physical Exam   GENERAL: alert and no distress  NECK: no adenopathy, no asymmetry, masses, or scars  RESP: lungs clear to auscultation - no rales, rhonchi or wheezes  CV: regular rate and rhythm, normal S1 S2, no S3 or S4, no murmur, click or rub, no peripheral edema  MS: no gross musculoskeletal defects noted, no edema    No results found for this or any previous visit (from the past 24 hours).        Signed Electronically by: SHAINA Rendon CNP    "

## 2024-12-20 DIAGNOSIS — E66.811 CLASS 1 OBESITY DUE TO EXCESS CALORIES WITHOUT SERIOUS COMORBIDITY WITH BODY MASS INDEX (BMI) OF 30.0 TO 30.9 IN ADULT: ICD-10-CM

## 2024-12-20 DIAGNOSIS — E66.09 CLASS 1 OBESITY DUE TO EXCESS CALORIES WITHOUT SERIOUS COMORBIDITY WITH BODY MASS INDEX (BMI) OF 30.0 TO 30.9 IN ADULT: ICD-10-CM

## 2024-12-26 RX ORDER — SEMAGLUTIDE 0.5 MG/.5ML
INJECTION, SOLUTION SUBCUTANEOUS
Qty: 2 ML | Refills: 0 | Status: SHIPPED | OUTPATIENT
Start: 2024-12-26

## 2025-02-12 ENCOUNTER — TRANSFERRED RECORDS (OUTPATIENT)
Dept: FAMILY MEDICINE | Facility: CLINIC | Age: 50
End: 2025-02-12

## 2025-02-13 ENCOUNTER — MYC MEDICAL ADVICE (OUTPATIENT)
Dept: FAMILY MEDICINE | Facility: CLINIC | Age: 50
End: 2025-02-13

## 2025-02-16 DIAGNOSIS — E66.09 CLASS 1 OBESITY DUE TO EXCESS CALORIES WITHOUT SERIOUS COMORBIDITY WITH BODY MASS INDEX (BMI) OF 30.0 TO 30.9 IN ADULT: Primary | ICD-10-CM

## 2025-02-16 DIAGNOSIS — E66.811 CLASS 1 OBESITY DUE TO EXCESS CALORIES WITHOUT SERIOUS COMORBIDITY WITH BODY MASS INDEX (BMI) OF 30.0 TO 30.9 IN ADULT: Primary | ICD-10-CM

## 2025-02-17 DIAGNOSIS — E66.811 CLASS 1 OBESITY DUE TO EXCESS CALORIES WITHOUT SERIOUS COMORBIDITY WITH BODY MASS INDEX (BMI) OF 30.0 TO 30.9 IN ADULT: ICD-10-CM

## 2025-02-17 DIAGNOSIS — E66.09 CLASS 1 OBESITY DUE TO EXCESS CALORIES WITHOUT SERIOUS COMORBIDITY WITH BODY MASS INDEX (BMI) OF 30.0 TO 30.9 IN ADULT: ICD-10-CM

## 2025-04-10 ENCOUNTER — TELEPHONE (OUTPATIENT)
Dept: FAMILY MEDICINE | Facility: CLINIC | Age: 50
End: 2025-04-10

## 2025-04-11 NOTE — TELEPHONE ENCOUNTER
[facility]. Prior authorization approved with approval dates 3/11/25-11/6/25. Sofía Monge

## 2025-04-17 ENCOUNTER — OFFICE VISIT (OUTPATIENT)
Dept: FAMILY MEDICINE | Facility: CLINIC | Age: 50
End: 2025-04-17

## 2025-04-17 VITALS
WEIGHT: 160.4 LBS | DIASTOLIC BLOOD PRESSURE: 80 MMHG | BODY MASS INDEX: 30.06 KG/M2 | OXYGEN SATURATION: 98 % | HEART RATE: 85 BPM | SYSTOLIC BLOOD PRESSURE: 111 MMHG

## 2025-04-17 DIAGNOSIS — E66.811 CLASS 1 OBESITY DUE TO EXCESS CALORIES WITHOUT SERIOUS COMORBIDITY WITH BODY MASS INDEX (BMI) OF 30.0 TO 30.9 IN ADULT: Primary | ICD-10-CM

## 2025-04-17 DIAGNOSIS — M99.05 SOMATIC DYSFUNCTION OF PELVIS REGION: ICD-10-CM

## 2025-04-17 DIAGNOSIS — E66.09 CLASS 1 OBESITY DUE TO EXCESS CALORIES WITHOUT SERIOUS COMORBIDITY WITH BODY MASS INDEX (BMI) OF 30.0 TO 30.9 IN ADULT: Primary | ICD-10-CM

## 2025-04-17 NOTE — PROGRESS NOTES
Answers submitted by the patient for this visit:  General Questionnaire (Submitted on 4/16/2025)  Chief Complaint: Chronic problems general questions HPI Form  What is the reason for your visit today? : weight management  How many servings of fruits and vegetables do you eat daily?: 2-3  On average, how many sweetened beverages do you drink each day (Examples: soda, juice, sweet tea, etc.  Do NOT count diet or artificially sweetened beverages)?: 0  How many minutes a day do you exercise enough to make your heart beat faster?: 30 to 60  How many days a week do you exercise enough to make your heart beat faster?: 4  How many days per week do you miss taking your medication?: 0  Questionnaire about: Chronic problems general questions HPI Form (Submitted on 4/16/2025)  Chief Complaint: Chronic problems general questions HPI Form    Assessment & Plan   Assessment & Plan  Class 1 obesity due to excess calories without serious comorbidity with body mass index (BMI) of 30.0 to 30.9 in adult  -patient has been tolerating WeGovy 0.5 mg, will increase to 1 mg  -looking to get to 1.7 mg or 2.4 mg for maintenance  -patient goal is 130 lbs  Orders:    Semaglutide-Weight Management (WEGOVY) 1 MG/0.5ML pen; Inject 1 mg subcutaneously once a week.    Somatic dysfunction of pelvis region  -right anteriorly rotated innominate  -ME utilized to bring back into balance  -patient to start utilizing PT exercises to try and keep in balance  -if patient continues to have hip pain, can send back to PT for reevaluation          Follow-up   Return in about 2 months (around 6/17/2025) for Follow up weight management, hip.    Richie Parham is a 50 year old, presenting for the following health issues:  MWM (Was seeing Lashaun for JAYM, here for a follow up. /On wegovy, was on monjaro. Denies any symptoms. ) and Musculoskeletal Problem (Right hip pain, has done physical therapy. Pre-existing injury. )    History of Present Illness       Reason  for visit:  Weight management    She eats 2-3 servings of fruits and vegetables daily.She consumes 0 sweetened beverage(s) daily.She exercises with enough effort to increase her heart rate 30 to 60 minutes per day.  She exercises with enough effort to increase her heart rate 4 days per week.   She is taking medications regularly.      Patient is being seen for weight management follow up. Patient has been utilizing WeGovy, currently on 0.5 mg dosing. Patient has been trying to incorporate more exercise into her daily regimen, walking and biking. Patient previously has had problems with nausea and WeGovy requiring Zofran. Was previously on Mounjaro but had to switch due to cost/insurance. Patient states that she is not currently having any problems with nausea at this time. Patient states that she would feel comfortable increasing her dosing at the end of this month.    Patient states that her goal is that she wants to feel better. Patient states that see feels best at 130 lbs. Patient states that she walks every day.     Patient states that her right hip has been bothering her. Pt states that she has completed PT in the past with improvement. Notes discomfort with walking and none with biking.    Patient notes new family history of mother with breast cancer.    Review of Systems   Musculoskeletal:  Positive for joint pain.          Objective    /80 (BP Location: Right arm)   Pulse 85   Wt 72.8 kg (160 lb 6.4 oz)   SpO2 98%   BMI 30.06 kg/m    Body mass index is 30.06 kg/m .  Physical Exam  Constitutional:       General: She is not in acute distress.     Appearance: Normal appearance.   HENT:      Head: Normocephalic and atraumatic.   Eyes:      Conjunctiva/sclera: Conjunctivae normal.   Cardiovascular:      Rate and Rhythm: Normal rate and regular rhythm.      Pulses: Normal pulses.      Heart sounds: Normal heart sounds.   Pulmonary:      Effort: Pulmonary effort is normal. No respiratory distress.       Breath sounds: Normal breath sounds.   Skin:     General: Skin is warm and dry.   Neurological:      Mental Status: She is alert.   Psychiatric:         Thought Content: Thought content normal.              Signed Electronically by: Keira Jha DO

## 2025-05-22 ENCOUNTER — MYC MEDICAL ADVICE (OUTPATIENT)
Dept: FAMILY MEDICINE | Facility: CLINIC | Age: 50
End: 2025-05-22

## 2025-05-22 DIAGNOSIS — E66.09 CLASS 1 OBESITY DUE TO EXCESS CALORIES WITHOUT SERIOUS COMORBIDITY WITH BODY MASS INDEX (BMI) OF 30.0 TO 30.9 IN ADULT: ICD-10-CM

## 2025-05-22 DIAGNOSIS — E66.811 CLASS 1 OBESITY DUE TO EXCESS CALORIES WITHOUT SERIOUS COMORBIDITY WITH BODY MASS INDEX (BMI) OF 30.0 TO 30.9 IN ADULT: ICD-10-CM

## 2025-05-22 RX ORDER — SEMAGLUTIDE 1 MG/.5ML
INJECTION, SOLUTION SUBCUTANEOUS
OUTPATIENT
Start: 2025-05-22

## 2025-05-22 NOTE — TELEPHONE ENCOUNTER
Med: Wegovy    LOV (related): 4/17/25      Due for F/U around: 6/2025    Next Appt: 6/2/25        Wt Readings from Last 2 Encounters:   04/17/25 72.8 kg (160 lb 6.4 oz)   11/25/24 72.6 kg (160 lb)       BMI Readings from Last 2 Encounters:   04/17/25 30.06 kg/m    11/25/24 29.99 kg/m

## 2025-06-02 ENCOUNTER — OFFICE VISIT (OUTPATIENT)
Dept: FAMILY MEDICINE | Facility: CLINIC | Age: 50
End: 2025-06-02

## 2025-06-02 VITALS
HEIGHT: 62 IN | DIASTOLIC BLOOD PRESSURE: 83 MMHG | SYSTOLIC BLOOD PRESSURE: 128 MMHG | BODY MASS INDEX: 28.6 KG/M2 | HEART RATE: 70 BPM | WEIGHT: 155.4 LBS | OXYGEN SATURATION: 97 %

## 2025-06-02 DIAGNOSIS — E66.09 CLASS 1 OBESITY DUE TO EXCESS CALORIES WITHOUT SERIOUS COMORBIDITY WITH BODY MASS INDEX (BMI) OF 30.0 TO 30.9 IN ADULT: ICD-10-CM

## 2025-06-02 DIAGNOSIS — E66.811 CLASS 1 OBESITY DUE TO EXCESS CALORIES WITHOUT SERIOUS COMORBIDITY WITH BODY MASS INDEX (BMI) OF 30.0 TO 30.9 IN ADULT: ICD-10-CM

## 2025-06-02 PROCEDURE — 99213 OFFICE O/P EST LOW 20 MIN: CPT | Performed by: STUDENT IN AN ORGANIZED HEALTH CARE EDUCATION/TRAINING PROGRAM

## 2025-06-02 PROCEDURE — 3079F DIAST BP 80-89 MM HG: CPT | Performed by: STUDENT IN AN ORGANIZED HEALTH CARE EDUCATION/TRAINING PROGRAM

## 2025-06-02 PROCEDURE — 3074F SYST BP LT 130 MM HG: CPT | Performed by: STUDENT IN AN ORGANIZED HEALTH CARE EDUCATION/TRAINING PROGRAM

## 2025-06-02 ASSESSMENT — ENCOUNTER SYMPTOMS
DIARRHEA: 1
NAUSEA: 1

## 2025-06-02 NOTE — PROGRESS NOTES
Assessment & Plan   Assessment & Plan  Class 1 obesity due to excess calories without serious comorbidity with body mass index (BMI) of 30.0 to 30.9 in adult  -patient currently tolerating dose well with some mild side effects  -1.7 mg is a maintenance dose, will stay here for at least one more month before considering 2.4 max dose  -patient has been increasing her exercise  -has lost 5 lbs since last in person appt  -will check in via [facility] in 1 month and then if change dose, another in person check in 1 month after that  Orders:    Semaglutide-Weight Management (WEGOVY) 1.7 MG/0.75ML pen; Inject 1.7 mg subcutaneously once a week.      If hip pain continues or does not improve, will consider additional PT or OMM.    Richie Parham is a 50 year old, presenting for the following health issues:  Medical Weight Management (Patient is taking 1.7 mg Semaglutide, patient reports it is going well.), Hip right (Hip is still hurting a bit, but is improved. Patient went to physical therapy, has been doing exercises. ), and Imm/Inj (Patient would like to do vaccines a different day (Shingles, Prevnar, Covid))    Hip right  Associated symptoms include nausea.   Imm/Inj  Associated symptoms include nausea.   History of Present Illness       Reason for visit:  Weight management    She eats 2-3 servings of fruits and vegetables daily.She consumes 0 sweetened beverage(s) daily.She exercises with enough effort to increase her heart rate 20 to 29 minutes per day.  She exercises with enough effort to increase her heart rate 3 or less days per week.   She is taking medications regularly.        Patient presents for follow up for weight management, patient was last seen on 4/17/2025 and her weight was 160 lbs with a goal of 130 lbs. Patient just recently increased to 1.7 mg dosing. Would like to stay here for maintenance for now.    Patient states that she feels like she has been tolerating the dose adjustment. Patient had one  "bout of nausea and a few episodes of diarrhea. Patient states that she also is adding natural fiber to her diet.    Patient is down 5 lbs compared to last appointment.    Patient has been increasing her biking and continues to walk every day. Patient continues hip exercises from PT with some mild pain.    Review of Systems   Gastrointestinal:  Positive for diarrhea and nausea.          Objective    /83   Pulse 70   Ht 1.568 m (5' 1.75\")   Wt 70.5 kg (155 lb 6.4 oz)   SpO2 97%   BMI 28.65 kg/m    Body mass index is 28.65 kg/m .  Physical Exam  Constitutional:       General: She is not in acute distress.     Appearance: Normal appearance.   HENT:      Head: Normocephalic and atraumatic.   Eyes:      Conjunctiva/sclera: Conjunctivae normal.   Cardiovascular:      Rate and Rhythm: Normal rate and regular rhythm.      Pulses: Normal pulses.      Heart sounds: Normal heart sounds.   Pulmonary:      Effort: Pulmonary effort is normal. No respiratory distress.      Breath sounds: Normal breath sounds.   Skin:     General: Skin is warm and dry.   Neurological:      Mental Status: She is alert.   Psychiatric:         Thought Content: Thought content normal.            Signed Electronically by: Keira Jha,     "

## 2025-06-14 ENCOUNTER — HEALTH MAINTENANCE LETTER (OUTPATIENT)
Age: 50
End: 2025-06-14

## 2025-07-12 DIAGNOSIS — E66.811 CLASS 1 OBESITY DUE TO EXCESS CALORIES WITHOUT SERIOUS COMORBIDITY WITH BODY MASS INDEX (BMI) OF 30.0 TO 30.9 IN ADULT: ICD-10-CM

## 2025-07-12 DIAGNOSIS — E66.09 CLASS 1 OBESITY DUE TO EXCESS CALORIES WITHOUT SERIOUS COMORBIDITY WITH BODY MASS INDEX (BMI) OF 30.0 TO 30.9 IN ADULT: ICD-10-CM

## 2025-07-14 RX ORDER — SEMAGLUTIDE 1.7 MG/.75ML
INJECTION, SOLUTION SUBCUTANEOUS
Qty: 3 ML | Refills: 0 | Status: SHIPPED | OUTPATIENT
Start: 2025-07-14

## 2025-07-14 NOTE — TELEPHONE ENCOUNTER
Med: Wegovy    LOV (related): 6/2/25 with Dr Jha       Due for F/U around: Will check in via MyChart in 1 month and then if change dose, another in person check in 1 month after that     Next Appt: None      Wt Readings from Last 2 Encounters:   06/02/25 70.5 kg (155 lb 6.4 oz)   04/17/25 72.8 kg (160 lb 6.4 oz)       BMI Readings from Last 2 Encounters:   06/02/25 28.65 kg/m    04/17/25 30.06 kg/m